# Patient Record
Sex: MALE | Race: ASIAN | NOT HISPANIC OR LATINO | Employment: FULL TIME | URBAN - METROPOLITAN AREA
[De-identification: names, ages, dates, MRNs, and addresses within clinical notes are randomized per-mention and may not be internally consistent; named-entity substitution may affect disease eponyms.]

---

## 2017-02-16 DIAGNOSIS — H40.2230 CHRONIC ANGLE-CLOSURE GLAUCOMA OF BOTH EYES, UNSPECIFIED GLAUCOMA STAGE: ICD-10-CM

## 2017-02-16 RX ORDER — LATANOPROST 50 UG/ML
1 SOLUTION/ DROPS OPHTHALMIC AT BEDTIME
Qty: 5 ML | Refills: 0 | Status: SHIPPED | OUTPATIENT
Start: 2017-02-16 | End: 2017-07-24

## 2017-02-16 NOTE — TELEPHONE ENCOUNTER
latanoprost (XALATAN) 0.005 % ophthalmic solution  Last Written Prescription Date:  8/24/16  Last Fill Quantity: 3 bottle,   # refills: 1  Last Office Visit with [facility], [facility] or [facility] prescribing provider: 2/11/16  Future Office visit:   None    Office Visit     2/11/2016  Eye Clinic    Multiple Providers   Ophthalmology    Chronic angle-closure glaucoma of both eyes, stage unspecified +1 more   Dx    Angle Closure Glaucoma Follow Up ; Referred by Referring , Oly, MD   Reason for visit    Progress Notes   Rosalio Aguilar MD (Physician)     Ophthalmology      Assessment & Plan   Assessment & Plan        Elver Ruelas is a 65 year old male with the following diagnoses:   1. Chronic angle-closure glaucoma of both eyes, stage unspecified    2. Blind right eye          End-stage narrow angle glaucoma (POAG) right eye. No light perception. No pain     High risk left eye with appositional closure > 270 degrees.   Now s/p laser peripheral iridotomy (LPI)   Patent today with improved focal anterior chamber depth  Minimal peripheral anterior synechiae      Good intraocular pressure response  Continue latanoprost at bedtime left eye for now  Recommend recheck in 3 mo with repeat visual field         Patient disposition:   Return in about 3 months (around 5/11/2016) for Repeat VF, Gonio and IOP check; .                    Routing refill request to provider for review/approval because:    latanoprost (XALATAN) 0.005 % ophthalmic solution. Needs f/u appt.

## 2017-02-23 ENCOUNTER — TELEPHONE (OUTPATIENT)
Dept: OPHTHALMOLOGY | Facility: CLINIC | Age: 67
End: 2017-02-23

## 2017-07-24 ENCOUNTER — TELEPHONE (OUTPATIENT)
Dept: OPHTHALMOLOGY | Facility: CLINIC | Age: 67
End: 2017-07-24

## 2017-12-14 ENCOUNTER — OFFICE VISIT (OUTPATIENT)
Dept: OPHTHALMOLOGY | Facility: CLINIC | Age: 67
End: 2017-12-14
Attending: OPHTHALMOLOGY
Payer: MEDICARE

## 2017-12-14 DIAGNOSIS — H40.2230 CHRONIC ANGLE-CLOSURE GLAUCOMA OF BOTH EYES: Primary | ICD-10-CM

## 2017-12-14 DIAGNOSIS — H54.40 BLIND RIGHT EYE: ICD-10-CM

## 2017-12-14 DIAGNOSIS — H40.2221 CHRONIC ANGLE-CLOSURE GLAUCOMA OF LEFT EYE, MILD STAGE: ICD-10-CM

## 2017-12-14 DIAGNOSIS — H25.13 AGE-RELATED NUCLEAR CATARACT OF BOTH EYES: ICD-10-CM

## 2017-12-14 DIAGNOSIS — H40.2213 CHRONIC ANGLE-CLOSURE GLAUCOMA OF RIGHT EYE, SEVERE STAGE: Primary | ICD-10-CM

## 2017-12-14 PROCEDURE — 99213 OFFICE O/P EST LOW 20 MIN: CPT | Mod: ZF

## 2017-12-14 PROCEDURE — 92083 EXTENDED VISUAL FIELD XM: CPT | Mod: ZF | Performed by: OPHTHALMOLOGY

## 2017-12-14 PROCEDURE — 92133 CPTRZD OPH DX IMG PST SGM ON: CPT | Mod: ZF | Performed by: OPHTHALMOLOGY

## 2017-12-14 RX ORDER — LATANOPROST 50 UG/ML
1 SOLUTION/ DROPS OPHTHALMIC AT BEDTIME
Qty: 12 BOTTLE | Refills: 0 | Status: SHIPPED | OUTPATIENT
Start: 2017-12-14 | End: 2017-12-14

## 2017-12-14 RX ORDER — LISINOPRIL AND HYDROCHLOROTHIAZIDE 20; 25 MG/1; MG/1
1 TABLET ORAL
COMMUNITY

## 2017-12-14 RX ORDER — LATANOPROST 50 UG/ML
1 SOLUTION/ DROPS OPHTHALMIC AT BEDTIME
Qty: 12 BOTTLE | Refills: 0 | Status: SHIPPED | OUTPATIENT
Start: 2017-12-14 | End: 2019-01-08

## 2017-12-14 ASSESSMENT — CONF VISUAL FIELD
OD_INFERIOR_TEMPORAL_RESTRICTION: 1
OD_INFERIOR_NASAL_RESTRICTION: 1
OD_SUPERIOR_NASAL_RESTRICTION: 1
OD_SUPERIOR_TEMPORAL_RESTRICTION: 1
OS_NORMAL: 1
METHOD: COUNTING FINGERS

## 2017-12-14 ASSESSMENT — VISUAL ACUITY
OS_SC: 20/25
OD_SC: NLP
METHOD: SNELLEN - LINEAR
CORRECTION_TYPE: GLASSES
OS_SC+: -3

## 2017-12-14 ASSESSMENT — TONOMETRY
OS_IOP_MMHG: 11
OD_IOP_MMHG: 41
IOP_METHOD: APPLANATION

## 2017-12-14 ASSESSMENT — EXTERNAL EXAM - RIGHT EYE: OD_EXAM: NORMAL

## 2017-12-14 ASSESSMENT — EXTERNAL EXAM - LEFT EYE: OS_EXAM: NORMAL

## 2017-12-14 ASSESSMENT — CUP TO DISC RATIO: OS_RATIO: 0.3

## 2017-12-14 ASSESSMENT — SLIT LAMP EXAM - LIDS
COMMENTS: NORMAL
COMMENTS: NORMAL

## 2017-12-14 NOTE — NURSING NOTE
Chief Complaints and History of Present Illnesses   Patient presents with     Follow Up For     Chronic angle-closure glaucoma of both eyes, stage unspecified      HPI    Affected eye(s):  Both   Symptoms:     Blurred vision   Decreased vision   Itching         Do you have eye pain now?:  No      Comments:  Pt thinks vision is getting worse in the LE. Sometimes OU itch.  Flor HERNANDEZ 1:24 PM December 14, 2017

## 2017-12-14 NOTE — MR AVS SNAPSHOT
After Visit Summary   12/14/2017    Elver Ruelas    MRN: [id number]           Patient Information     Date Of Birth          1950        Visit Information        Provider Department      12/14/2017 1:00 PM Ratna Burns Joshua Harlen, MD [facility]        Today's Diagnoses     Chronic angle-closure glaucoma    -  1    Chronic angle-closure glaucoma of both eyes, mild stage           Follow-ups after your visit        Follow-up notes from your care team     Return in about 6 months (around 6/14/2018) for Gonio with physician prior to dilation, 24-2 Dynamic VF.      Your next 10 appointments already scheduled     Jun 19, 2018  1:00 PM CDT   RETURN GENERAL with Rosalio Aguilar MD   Eye Clinic ([facility])    [facility]  [address] [facility]  [address] [address] [place] [id number] [phone]              Who to contact     Please call your clinic at [phone] to:    Ask questions about your health    Make or cancel appointments    Discuss your medicines    Learn about your test results    Speak to your doctor   If you have compliments or concerns about an experience at your clinic, or if you wish to file a complaint, please contact [facility] Patient Relations at [phone] or email us at [email or web address]         Additional Information About Your Visit        MyChart Information     Sossee is an electronic gateway that provides easy, online access to your medical records. With Sossee, you can request a clinic appointment, read your test results, renew a prescription or communicate with your care team.     To sign up for [employer] visit the website at [email or web address]   You will be asked to enter the access code listed below, as well as some personal information. Please follow the directions to create your username and password.     Your access code is: [email or web address]  Expires: 3/13/2018  6:30 AM     Your access  code will  in 90 days. If you need help or a new code, please contact your [facility] or call [phone] for assistance.        Care EveryWhere ID     This is your Care EveryWhere ID. This could be used by other organizations to access your [facility] medical records  [id number]         Blood Pressure from Last 3 Encounters:   16 106/63   16 138/80   12 129/73    Weight from Last 3 Encounters:   16 61.1 kg (134 lb 9.6 oz)   16 59 kg (130 lb)   12 56.7 kg (125 lb)              We Performed the Following     OCT Optic Nerve RNFL Spectralis OU (both eyes)     OVF 24-2 Dynamic OS          Today's Medication Changes          These changes are accurate as of: 17  2:29 PM.  If you have any questions, ask your nurse or doctor.               Start taking these medicines.        Dose/Directions    latanoprost 0.005 % ophthalmic solution   Commonly known as:  XALATAN   Used for:  Chronic angle-closure glaucoma of both eyes, mild stage   Started by:  Rosalio Aguilar MD        Dose:  1 drop   Place 1 drop Into the left eye At Bedtime   Quantity:  12 Bottle   Refills:  0            Where to get your medicines      Some of these will need a paper prescription and others can be bought over the counter.  Ask your nurse if you have questions.     Bring a paper prescription for each of these medications     latanoprost 0.005 % ophthalmic solution                Primary Care Provider Office Phone # Fax #    John Otto [phone]       [facility]  [facility] [address]        Equal Access to Services     MAYUR SERNA AH: Hadii aad ku hadasho Sojaime, waaxda luqadaha, qaybta kaalmada fuentes, renetta [place]. So [facility] [phone].    ATENCIÓN: Si habla español, tiene a inman disposición servicios gratuitos de asistencia lingüística. Llame al [phone].    We comply with applicable  federal civil rights laws and [place] laws. We do not discriminate on the basis of race, color, national origin, age, disability, sex, sexual orientation, or gender identity.            Thank you!     Thank you for choosing EYE CLINIC  for your care. Our goal is always to provide you with excellent care. Hearing back from our patients is one way we can continue to improve our services. Please take a few minutes to complete the written survey that you may receive in the mail after your visit with us. Thank you!             Your Updated Medication List - Protect others around you: Learn how to safely use, store and throw away your medicines at www.disposemymeds.org.          This list is accurate as of: 12/14/17  2:29 PM.  Always use your most recent med list.                   Brand Name Dispense Instructions for use Diagnosis    amLODIPine-atorvastatin 10-80 MG per tablet    CADUET     Take 1 tablet by mouth daily        ASPIRIN PO      Take 81 mg by mouth daily        latanoprost 0.005 % ophthalmic solution    XALATAN    12 Bottle    Place 1 drop Into the left eye At Bedtime    Chronic angle-closure glaucoma of both eyes, mild stage       lisinopril-hydrochlorothiazide 20-25 MG per tablet    PRINZIDE/ZESTORETIC     Take 1 tablet by mouth daily        UNKNOWN TO PATIENT      HBP med, 1 pink tablet daily.

## 2017-12-15 ASSESSMENT — CUP TO DISC RATIO: OD_RATIO: 1.0

## 2017-12-15 NOTE — PROGRESS NOTES
Assessment & Plan      Elver Ruelas is a 67 year old male with the following diagnoses:   1. Chronic angle-closure glaucoma of right eye, severe stage    2. Chronic angle-closure glaucoma of left eye, mild stage    3. Blind right eye    4. Age-related nuclear cataract of both eyes       Lost to follow up for 1.5 years  End-stage narrow angle glaucoma (POAG) right eye.  No light perception.  Denies pain  High risk left eye with appositional closure > 270 degrees.    S/P laser peripheral iridotomy (LPI)   Patent today, mild peripheral anterior synechiae     Excellent intraocular pressure response  Visual field and OCT stable     Long discussion of importance of regular follow-up  Monocular precautions reviewed  Continue latanoprost at bedtime left eye     Patient disposition:   Return in about 6 months (around 6/14/2018) for Gonio with physician prior to dilation, 24-2 Dynamic VF.        Attending Physician Attestation:  Complete documentation of historical and exam elements from today's encounter can be found in the full encounter summary report (not reduplicated in this progress note).  I personally obtained the chief complaint(s) and history of present illness.  I confirmed and edited as necessary the review of systems, past medical/surgical history, family history, social history, and examination findings as documented by others; and I examined the patient myself.  I personally reviewed the relevant tests, images, and reports as documented above.  I formulated and edited as necessary the assessment and plan and discussed the findings and management plan with the patient and family.Attending Physician Image/Tesing Attestation: I personally reviewed the ophthalmic test(s) associated with this encounter, agree with the interpretation(s) as documented by the resident/fellow, and have edited the corresponding report(s) as necessary.   . - Rosalio Aguilar MD

## 2019-01-07 DIAGNOSIS — H40.2213 CHRONIC ANGLE-CLOSURE GLAUCOMA OF RIGHT EYE, SEVERE STAGE: Primary | ICD-10-CM

## 2019-01-07 DIAGNOSIS — H40.2230 CHRONIC ANGLE-CLOSURE GLAUCOMA OF BOTH EYES: ICD-10-CM

## 2019-01-08 ENCOUNTER — OFFICE VISIT (OUTPATIENT)
Dept: OPHTHALMOLOGY | Facility: CLINIC | Age: 69
End: 2019-01-08
Attending: OPHTHALMOLOGY
Payer: MEDICARE

## 2019-01-08 DIAGNOSIS — H54.40 BLIND RIGHT EYE: ICD-10-CM

## 2019-01-08 DIAGNOSIS — H25.13 AGE-RELATED NUCLEAR CATARACT OF BOTH EYES: ICD-10-CM

## 2019-01-08 DIAGNOSIS — H40.2221 CHRONIC ANGLE-CLOSURE GLAUCOMA OF LEFT EYE, MILD STAGE: Primary | ICD-10-CM

## 2019-01-08 PROCEDURE — 92083 EXTENDED VISUAL FIELD XM: CPT | Mod: ZF | Performed by: OPHTHALMOLOGY

## 2019-01-08 PROCEDURE — G0463 HOSPITAL OUTPT CLINIC VISIT: HCPCS | Mod: ZF

## 2019-01-08 PROCEDURE — 92133 CPTRZD OPH DX IMG PST SGM ON: CPT | Mod: ZF | Performed by: OPHTHALMOLOGY

## 2019-01-08 PROCEDURE — 92020 GONIOSCOPY: CPT | Mod: ZF | Performed by: OPHTHALMOLOGY

## 2019-01-08 RX ORDER — LATANOPROST 50 UG/ML
1 SOLUTION/ DROPS OPHTHALMIC AT BEDTIME
Qty: 12 BOTTLE | Refills: 0 | Status: SHIPPED | OUTPATIENT
Start: 2019-01-08 | End: 2021-08-05

## 2019-01-08 ASSESSMENT — GONIOSCOPY
OS_NASAL: NO STRUCTURES
OS_TEMPORAL: NO STRUCTURES
OS_SUPERIOR: NO STRUCTURES

## 2019-01-08 ASSESSMENT — TONOMETRY
OD_IOP_MMHG: 49
OS_IOP_MMHG: 15
IOP_METHOD: APPLANATION

## 2019-01-08 ASSESSMENT — PACHYMETRY
OS_CT(UM): 511
OD_CT(UM): 523

## 2019-01-08 ASSESSMENT — VISUAL ACUITY
OS_SC+: -1
OD_SC: NLP
METHOD_MR: DEFERRED.
METHOD: SNELLEN - LINEAR
OS_SC: 20/25

## 2019-01-08 ASSESSMENT — EXTERNAL EXAM - LEFT EYE: OS_EXAM: NORMAL

## 2019-01-08 ASSESSMENT — CUP TO DISC RATIO
OD_RATIO: 1.0
OS_RATIO: 0.3

## 2019-01-08 ASSESSMENT — CONF VISUAL FIELD
OD_SUPERIOR_NASAL_RESTRICTION: 1
OD_INFERIOR_NASAL_RESTRICTION: 1
OD_SUPERIOR_TEMPORAL_RESTRICTION: 1
OD_INFERIOR_TEMPORAL_RESTRICTION: 1

## 2019-01-08 ASSESSMENT — SLIT LAMP EXAM - LIDS
COMMENTS: NORMAL
COMMENTS: NORMAL

## 2019-01-08 ASSESSMENT — EXTERNAL EXAM - RIGHT EYE: OD_EXAM: NORMAL

## 2019-01-08 NOTE — NURSING NOTE
Patient presents for annual glaucoma exam with visual field testing. H/o Chronic angle-closure glaucoma of right eye, severe stage, blind. Since the last visit the vision in the LE remains the same. The eyes feel itchy bilateral. No pain or discomfort, no redness or tears. No flashes or floaters. Consistent with Latanoprost at night, does not miss. Does not wear corrective lenses. Patient is not interested in wearing glasses, will not wear. Mariaa Mckeon COT 1:54 PM January 8, 2019

## 2019-01-09 NOTE — PROGRESS NOTES
Chief Complaint(s) and History of Present Illness(es)     Glaucoma Follow-Up     Laterality: both eyes    Associated symptoms: Negative for redness, eye pain, floaters, flashes   and tearing    Treatment side effects: none    Compliance with Treatment: always    Pain scale: 0/10              Comments     Patient presents for annual glaucoma exam with visual field testing. H/o   Chronic angle-closure glaucoma of right eye, severe stage, blind. Since   the last visit the vision in the LE remains the same. The eyes feel itchy   bilateral. No pain or discomfort, no redness or tears. No flashes or   floaters. Consistent with Latanoprost at night, does not miss. Does not   wear corrective lenses. Patient is not interested in wearing glasses, will   not wear. Mariaa Mckeon COT 1:54 PM January 8, 2019              Review of systems for the eyes was negative other than the pertinent positives/negatives listed in the HPI.      Assessment & Plan      Elver Ruelas is a 68 year old male with the following diagnoses:   1. Chronic angle-closure glaucoma of left eye, mild stage    2. Blind right eye    3. Age-related nuclear cataract of both eyes         Missed 6 mo appt (here 1 year from previous exam)  Cataract with cortical progression.  Gonio with early peripheral anterior synechiae inferiorly  Intraocular pressure acceptable, patent laser peripheral iridotomy (LPI) left eye   Visual field and OCT stable     End-stage narrow angle glaucoma (POAG) right eye.  No light perception.  Denies pain    Long discussion of importance of regular follow-up; recommend repeat gonio and intraocular pressure check in 6 mo  Monocular precautions reviewed  Continue latanoprost at bedtime left eye         Patient disposition:   Return in about 6 months (around 7/8/2019) for VT only, Gonio, 24-2 dynamic.          Attending Physician Attestation:  Complete documentation of historical and exam elements from today's encounter can be found in the full  encounter summary report (not reduplicated in this progress note).  I personally obtained the chief complaint(s) and history of present illness.  I confirmed and edited as necessary the review of systems, past medical/surgical history, family history, social history, and examination findings as documented by others; and I examined the patient myself.  I personally reviewed the relevant tests, images, and reports as documented above.  I formulated and edited as necessary the assessment and plan and discussed the findings and management plan with the patient and family. . - Rosalio Aguilar MD

## 2021-08-05 ENCOUNTER — OFFICE VISIT (OUTPATIENT)
Dept: OPHTHALMOLOGY | Facility: CLINIC | Age: 71
End: 2021-08-05
Attending: OPHTHALMOLOGY
Payer: MEDICARE

## 2021-08-05 DIAGNOSIS — H25.813 MIXED TYPE AGE-RELATED CATARACT, BOTH EYES: ICD-10-CM

## 2021-08-05 DIAGNOSIS — H40.2221 CHRONIC ANGLE-CLOSURE GLAUCOMA OF LEFT EYE, MILD STAGE: Primary | ICD-10-CM

## 2021-08-05 DIAGNOSIS — H35.031 BLIND HYPERTENSIVE EYE, RIGHT: ICD-10-CM

## 2021-08-05 PROCEDURE — 92083 EXTENDED VISUAL FIELD XM: CPT | Performed by: OPHTHALMOLOGY

## 2021-08-05 PROCEDURE — G0463 HOSPITAL OUTPT CLINIC VISIT: HCPCS

## 2021-08-05 PROCEDURE — 66761 REVISION OF IRIS: CPT | Mod: LT | Performed by: OPHTHALMOLOGY

## 2021-08-05 PROCEDURE — 99214 OFFICE O/P EST MOD 30 MIN: CPT | Mod: 25 | Performed by: OPHTHALMOLOGY

## 2021-08-05 RX ORDER — LATANOPROST 50 UG/ML
1 SOLUTION/ DROPS OPHTHALMIC AT BEDTIME
Qty: 7.5 ML | Refills: 3 | Status: SHIPPED | OUTPATIENT
Start: 2021-08-05

## 2021-08-05 ASSESSMENT — PACHYMETRY
OD_CT(UM): 523
OS_CT(UM): 511

## 2021-08-05 ASSESSMENT — CONF VISUAL FIELD
OD_INFERIOR_NASAL_RESTRICTION: 1
METHOD: COUNTING FINGERS
OD_SUPERIOR_NASAL_RESTRICTION: 1
OD_INFERIOR_TEMPORAL_RESTRICTION: 1
OD_SUPERIOR_TEMPORAL_RESTRICTION: 1

## 2021-08-05 ASSESSMENT — GONIOSCOPY
OS_TEMPORAL: NO STRUCTURE
OD_SUPERIOR: CLOSED
OD_TEMPORAL: CLOSED
OD_NASAL: CLOSED
OS_SUPERIOR: NO STRUCTURE
OD_INFERIOR: CLOSED
OS_NASAL: BARELY VISIBLE

## 2021-08-05 ASSESSMENT — SLIT LAMP EXAM - LIDS
COMMENTS: NORMAL
COMMENTS: NORMAL

## 2021-08-05 ASSESSMENT — EXTERNAL EXAM - RIGHT EYE: OD_EXAM: NORMAL

## 2021-08-05 ASSESSMENT — EXTERNAL EXAM - LEFT EYE: OS_EXAM: NORMAL

## 2021-08-05 ASSESSMENT — VISUAL ACUITY
OS_SC: 20/25
OS_SC+: -1
METHOD: SNELLEN - LINEAR
OD_SC: NLP

## 2021-08-05 ASSESSMENT — TONOMETRY
OD_IOP_MMHG: 32
IOP_METHOD: TONOPEN
OS_IOP_MMHG: 18

## 2021-08-05 NOTE — NURSING NOTE
Chief Complaints and History of Present Illnesses   Patient presents with     Follow Up     Chronic angle-closure glaucoma of left eye, mild stage Blind right eye          Chief Complaint(s) and History of Present Illness(es)     Follow Up     Laterality: both eyes    Course: stable    Associated symptoms: Negative for eye pain, headache, floaters and flashes    Treatments tried: eye drops    Pain scale: 0/10    Comments: Chronic angle-closure glaucoma of left eye, mild stage Blind right eye                   Comments     Pt states no change in [place] since last visit  Pt would like a RX printed for 1 year      Noelle Aguilar COT 12:34 PM August 5, 2021

## 2021-08-27 ENCOUNTER — TELEPHONE (OUTPATIENT)
Dept: OPHTHALMOLOGY | Facility: CLINIC | Age: 71
End: 2021-08-27

## 2021-08-27 NOTE — TELEPHONE ENCOUNTER
Left message for patient (with  #[phone]) that Dr. Aguilar missed him at his appointment yesterday and would like to see him back in the next 1-2 weeks. Please call [phone] to reschedule.     RITO BABCOCK 9:28 AM August 27, 2021

## 2022-06-17 ENCOUNTER — LAB REQUISITION (OUTPATIENT)
Dept: LAB | Facility: CLINIC | Age: 72
End: 2022-06-17
Payer: MEDICARE

## 2022-06-17 DIAGNOSIS — Z00.00 ENCOUNTER FOR GENERAL ADULT MEDICAL EXAMINATION WITHOUT ABNORMAL FINDINGS: ICD-10-CM

## 2022-06-17 DIAGNOSIS — I10 ESSENTIAL (PRIMARY) HYPERTENSION: ICD-10-CM

## 2022-06-17 LAB
ALBUMIN SERPL-MCNC: 4.2 G/DL (ref 3.4–5)
ALP SERPL-CCNC: 51 U/L (ref 40–150)
ALT SERPL W P-5'-P-CCNC: 34 U/L (ref 0–70)
ANION GAP SERPL CALCULATED.3IONS-SCNC: 8 MMOL/L (ref 3–14)
AST SERPL W P-5'-P-CCNC: 30 U/L (ref 0–45)
BILIRUB SERPL-MCNC: 0.5 MG/DL (ref 0.2–1.3)
BUN SERPL-MCNC: 21 MG/DL (ref 7–30)
CALCIUM SERPL-MCNC: 8.9 MG/DL (ref 8.5–10.1)
CHLORIDE BLD-SCNC: 104 MMOL/L (ref 94–109)
CHOLEST SERPL-MCNC: 184 MG/DL
CO2 SERPL-SCNC: 28 MMOL/L (ref 20–32)
CREAT SERPL-MCNC: 0.65 MG/DL (ref 0.66–1.25)
FASTING STATUS PATIENT QL REPORTED: YES
GFR SERPL CREATININE-BSD FRML MDRD: >90 ML/MIN/1.73M2
GLUCOSE BLD-MCNC: 107 MG/DL (ref 70–99)
HDLC SERPL-MCNC: 58 MG/DL
LDLC SERPL CALC-MCNC: 108 MG/DL
NONHDLC SERPL-MCNC: 126 MG/DL
POTASSIUM BLD-SCNC: 4.3 MMOL/L (ref 3.4–5.3)
PROT SERPL-MCNC: 7.3 G/DL (ref 6.8–8.8)
SODIUM SERPL-SCNC: 140 MMOL/L (ref 133–144)
TRIGL SERPL-MCNC: 92 MG/DL

## 2022-06-17 PROCEDURE — 80061 LIPID PANEL: CPT | Mod: ORL | Performed by: FAMILY MEDICINE

## 2022-06-17 PROCEDURE — 80053 COMPREHEN METABOLIC PANEL: CPT | Mod: ORL | Performed by: FAMILY MEDICINE

## 2022-06-30 ENCOUNTER — OFFICE VISIT (OUTPATIENT)
Dept: OPHTHALMOLOGY | Facility: CLINIC | Age: 72
End: 2022-06-30
Attending: OPHTHALMOLOGY
Payer: MEDICARE

## 2022-06-30 DIAGNOSIS — H25.813 MIXED TYPE AGE-RELATED CATARACT, BOTH EYES: Primary | ICD-10-CM

## 2022-06-30 DIAGNOSIS — H40.2221 CHRONIC ANGLE-CLOSURE GLAUCOMA OF LEFT EYE, MILD STAGE: ICD-10-CM

## 2022-06-30 DIAGNOSIS — H35.031 BLIND HYPERTENSIVE EYE, RIGHT: ICD-10-CM

## 2022-06-30 PROCEDURE — G0463 HOSPITAL OUTPT CLINIC VISIT: HCPCS | Mod: 25

## 2022-06-30 PROCEDURE — 76519 ECHO EXAM OF EYE: CPT | Performed by: OPHTHALMOLOGY

## 2022-06-30 PROCEDURE — 99214 OFFICE O/P EST MOD 30 MIN: CPT | Mod: GC | Performed by: OPHTHALMOLOGY

## 2022-06-30 PROCEDURE — 92025 CPTRIZED CORNEAL TOPOGRAPHY: CPT | Performed by: OPHTHALMOLOGY

## 2022-06-30 PROCEDURE — G0463 HOSPITAL OUTPT CLINIC VISIT: HCPCS

## 2022-06-30 ASSESSMENT — EXTERNAL EXAM - LEFT EYE: OS_EXAM: NORMAL

## 2022-06-30 ASSESSMENT — TONOMETRY
OD_IOP_MMHG: 34
OS_IOP_MMHG: 14
IOP_METHOD: TONOPEN

## 2022-06-30 ASSESSMENT — SLIT LAMP EXAM - LIDS
COMMENTS: NORMAL
COMMENTS: NORMAL

## 2022-06-30 ASSESSMENT — VISUAL ACUITY
OS_SC: 20/25
METHOD: SNELLEN - LINEAR
OD_SC: NLP
OS_SC+: -2

## 2022-06-30 ASSESSMENT — CONF VISUAL FIELD
OD_SUPERIOR_TEMPORAL_RESTRICTION: 1
OD_SUPERIOR_NASAL_RESTRICTION: 1
OD_INFERIOR_NASAL_RESTRICTION: 1
OS_NORMAL: 1
OD_INFERIOR_TEMPORAL_RESTRICTION: 1

## 2022-06-30 ASSESSMENT — CUP TO DISC RATIO
OS_RATIO: 0.3
OD_RATIO: 1.0

## 2022-06-30 ASSESSMENT — EXTERNAL EXAM - RIGHT EYE: OD_EXAM: NORMAL

## 2022-06-30 NOTE — PROGRESS NOTES
Chief Complaint(s) and History of Present Illness(es)     Angle Closure Glaucoma Follow Up         Comments     Patient states that his left eye vision is the same.   No flashes of light. No pain and irritation. No floaters.    Ocular Meds: Latanoprost daily in the ELIAS Bo COT, June 30, 2022 1:10 PM     Review of systems for the eyes was negative other than the pertinent positives/negatives listed in the HPI.      Assessment & Plan      Elver Ruelas is a 70 year old male with the following diagnoses:   1. Chronic angle-closure glaucoma of left eye, mild stage    2. Mixed type age-related cataract, both eyes    3. Blind hypertensive eye, right       History obtained via interpretor   Lost to follow  2019 - 2021  Patient is on Latanoprost at bedtime left eye, reports good compliance  Right eye is comfortable    Vision overall stable per patient    End-stage narrow angle glaucoma (POAG) right eye.  No light perception.  Denies pain  High risk left eye with appositional closure > 270 degrees.    S/p repeat LPI left eye; now patent temporally  I believe his risk of angle closure is higher than with monocular cataract surgery left eye     Special equipment/needs:  Anesthesia: General  Dilation: Unknown  Iris expansion: Unknown  Pseudoexfoliation: No pseudoexfoliation  Trypan Blue: Yes   CEIOL left eye, faculty, emmetropia  Dex/NSAID  +/- GSL     Return precautions reviewed   Continue latanoprost at bedtime left eye for now      Rahul Polanco, PGY4  Ophthalmology Resident      Attending Physician Attestation:  Complete documentation of historical and exam elements from today's encounter can be found in the full encounter summary report (not reduplicated in this progress note).  I personally obtained the chief complaint(s) and history of present illness.  I confirmed and edited as necessary the review of systems, past medical/surgical history, family history, social history, and examination findings as  documented by others; and I examined the patient myself.  I personally reviewed the relevant tests, images, and reports as documented above.  I formulated and edited as necessary the assessment and plan and discussed the findings and management plan with the patient and family Attending Physician Image/Tesing Attestation: I personally reviewed the ophthalmic test(s) associated with this encounter, agree with the interpretation(s) as documented by the resident/fellow, and have edited the corresponding report(s) as necessary.  Today with Elver Ruelas  and his wife, I reviewed the indications, risks, benefits, and alternatives of the proposed surgical procedure including, but not limited to, failure obtain the desired result  and need for additional surgery, bleeding, infection, loss of vision, loss of the eye, and the remote possibility of permanent damage to any organ system or death with the use of anesthesia.  I provided multiple opportunities for the questions, answered all questions to the best of my ability, and confirmed that my answers and my discussion were understood.      .

## 2022-06-30 NOTE — NURSING NOTE
Chief Complaints and History of Present Illnesses   Patient presents with     Angle Closure Glaucoma Follow Up     Chief Complaint(s) and History of Present Illness(es)     Angle Closure Glaucoma Follow Up               Comments     Patient states that his left eye vision is the same.   No flashes of light. No pain and irritation. No floaters.    Ocular Meds: Latanoprost daily in the     Navi Schultezate COT, June 30, 2022 1:10 PM

## 2022-07-01 ENCOUNTER — TELEPHONE (OUTPATIENT)
Dept: OPHTHALMOLOGY | Facility: CLINIC | Age: 72
End: 2022-07-01

## 2022-07-01 PROBLEM — H40.2221 CHRONIC ANGLE-CLOSURE GLAUCOMA OF LEFT EYE, MILD STAGE: Status: ACTIVE | Noted: 2022-07-01

## 2022-07-01 PROBLEM — H25.813 MIXED TYPE AGE-RELATED CATARACT, BOTH EYES: Status: ACTIVE | Noted: 2022-07-01

## 2022-07-01 NOTE — TELEPHONE ENCOUNTER
Called patients daughter Thy to schedule procedure with Dr. Aguilar, there was no answer.  Left message with my direct line [phone].

## 2022-07-07 NOTE — TELEPHONE ENCOUNTER
FUTURE VISIT INFORMATION      SURGERY INFORMATION:    Date: 8/15/22    Location: or    Surgeon:  Rosalio Aguilar MD    Anesthesia Type:  general    Procedure: LEFT EYE PHACOEMULSIFICATION, COMPLEX CATARACT, WITH STANDARD INTRAOCULAR LENS IMPLANT INSERTION    Consult: ov 6/30    RECORDS REQUESTED FROM:       Primary Care Provider: AUDREYANNIA    Pertinent Medical History: hypertension

## 2022-07-09 ENCOUNTER — HEALTH MAINTENANCE LETTER (OUTPATIENT)
Age: 72
End: 2022-07-09

## 2022-08-11 ENCOUNTER — PRE VISIT (OUTPATIENT)
Dept: SURGERY | Facility: CLINIC | Age: 72
End: 2022-08-11

## 2022-08-11 ENCOUNTER — ANESTHESIA EVENT (OUTPATIENT)
Dept: SURGERY | Facility: AMBULATORY SURGERY CENTER | Age: 72
End: 2022-08-11
Payer: MEDICARE

## 2022-08-11 ENCOUNTER — LAB (OUTPATIENT)
Dept: LAB | Facility: CLINIC | Age: 72
End: 2022-08-11

## 2022-08-11 ENCOUNTER — OFFICE VISIT (OUTPATIENT)
Dept: SURGERY | Facility: CLINIC | Age: 72
End: 2022-08-11
Payer: MEDICARE

## 2022-08-11 VITALS
SYSTOLIC BLOOD PRESSURE: 143 MMHG | OXYGEN SATURATION: 97 % | DIASTOLIC BLOOD PRESSURE: 89 MMHG | RESPIRATION RATE: 16 BRPM | TEMPERATURE: 98.5 F | BODY MASS INDEX: 24.48 KG/M2 | WEIGHT: 133 LBS | HEIGHT: 62 IN | HEART RATE: 75 BPM

## 2022-08-11 DIAGNOSIS — Z20.822 ENCOUNTER FOR LABORATORY TESTING FOR COVID-19 VIRUS: ICD-10-CM

## 2022-08-11 DIAGNOSIS — Z01.818 PRE-OP EVALUATION: Primary | ICD-10-CM

## 2022-08-11 LAB — SARS-COV-2 RNA RESP QL NAA+PROBE: NEGATIVE

## 2022-08-11 PROCEDURE — U0003 INFECTIOUS AGENT DETECTION BY NUCLEIC ACID (DNA OR RNA); SEVERE ACUTE RESPIRATORY SYNDROME CORONAVIRUS 2 (SARS-COV-2) (CORONAVIRUS DISEASE [COVID-19]), AMPLIFIED PROBE TECHNIQUE, MAKING USE OF HIGH THROUGHPUT TECHNOLOGIES AS DESCRIBED BY CMS-2020-01-R: HCPCS | Performed by: FAMILY MEDICINE

## 2022-08-11 PROCEDURE — 99203 OFFICE O/P NEW LOW 30 MIN: CPT | Performed by: PHYSICIAN ASSISTANT

## 2022-08-11 ASSESSMENT — PAIN SCALES - GENERAL: PAINLEVEL: NO PAIN (0)

## 2022-08-11 ASSESSMENT — LIFESTYLE VARIABLES: TOBACCO_USE: 1

## 2022-08-11 NOTE — H&P
Pre-Operative H & P     CC:  Preoperative exam to assess for increased cardiopulmonary risk while undergoing surgery and anesthesia.    Date of Encounter: 8/11/2022  Primary Care Physician:  John Otto     Reason for visit:   Encounter Diagnosis   Name Primary?     Pre-op evaluation Yes       HPI  Elver Ruelas is a 71 year old male who presents for pre-operative H & P in preparation for  Procedure Information     Case: [id number] Date/Time: 08/15/22 0840    Procedure: LEFT EYE PHACOEMULSIFICATION, COMPLEX CATARACT, WITH STANDARD INTRAOCULAR LENS IMPLANT INSERTION (Left Eye)    Anesthesia type: General    Diagnosis:       Chronic angle-closure glaucoma of left eye, mild stage [H40.2221]      Mixed type age-related cataract, both eyes [H25.813]    Pre-op diagnosis:       Chronic angle-closure glaucoma of left eye, mild stage [H40.2221]      Mixed type age-related cataract, both eyes [H25.813]    Location: [place] [address] / [facility]    Providers: Rosalio Aguilar MD          Patient is being evaluated for comorbid conditions of hypertension, dyslipidemia, former tobacco use    Mr. Ruelas has a history of chronic glaucoma and cataracts. He follows with Dr. Aguilar. He is now scheduled for the above procedure.     History is obtained from the patient and chart review. Patient's wife was present.  assisted with this video    Hx of abnormal bleeding or anti-platelet use: ASA 81      Past Medical History  Past Medical History:   Diagnosis Date     Ex-smoker      Hyperlipidemia LDL goal < 160        Past Surgical History  No past surgical history on file.    Prior to Admission Medications  Current Outpatient Medications   Medication Sig Dispense Refill     amLODIPine-atorvastatin (CADUET) 10-80 MG per tablet Take 1 tablet by mouth daily (with lunch)       ASPIRIN PO Take 81 mg by mouth daily (with lunch)       lisinopril-hydrochlorothiazide  (PRINZIDE/ZESTORETIC) 20-25 MG per tablet Take 1 tablet by mouth daily (with lunch)       latanoprost (XALATAN) 0.005 % ophthalmic solution Place 1 drop Into the left eye At Bedtime 7.5 mL 3       Allergies  No Known Allergies    Social History  Social History     Socioeconomic History     Marital status:      Spouse name: Not on file     Number of children: Not on file     Years of education: Not on file     Highest education level: Not on file   Occupational History     Not on file   Tobacco Use     Smoking status: Never Smoker     Smokeless tobacco: Never Used   Substance and Sexual Activity     Alcohol use: Yes     Comment: 2/yr     Drug use: No     Sexual activity: Yes     Partners: Female   Other Topics Concern     Parent/sibling w/ CABG, MI or angioplasty before 65F 55M? No   Social History Narrative    ** Merged History Encounter **          Social Determinants of Health     Financial Resource Strain: Not on file   Food Insecurity: Not on file   Transportation Needs: Not on file   Physical Activity: Not on file   Stress: Not on file   Social Connections: Not on file   Intimate Partner Violence: Not on file   Housing Stability: Not on file       Family History  Family History   Problem Relation Age of Onset     Arthritis Mother      Alcohol/Drug Father      Gastrointestinal Disease Father      Glaucoma No family hx of      Macular Degeneration No family hx of      Diabetes No family hx of      Anesthesia Reaction No family hx of      Deep Vein Thrombosis (DVT) No family hx of        Review of Systems  The complete review of systems is negative other than noted in the HPI or here.   Anesthesia Evaluation   Pt has not had prior anesthetic         ROS/MED HX  ENT/Pulmonary:     (+) FRANCIE risk factors, snores loudly, hypertension, tobacco use, Past use,     Neurologic:  - neg neurologic ROS     Cardiovascular:     (+) Dyslipidemia hypertension-----Taking blood thinners     METS/Exercise Tolerance: >4 METS  "Comment: Going for walks, leg and arm exercises. 2 hours daily   Hematologic:  - neg hematologic  ROS  (-) history of blood clots and history of blood transfusion   Musculoskeletal:  - neg musculoskeletal ROS     GI/Hepatic:     (+) liver disease (heatic steatosis ),     Renal/Genitourinary:  - neg Renal ROS     Endo:  - neg endo ROS  (-) chronic steroid usage   Psychiatric/Substance Use:  - neg psychiatric ROS     Infectious Disease:  - neg infectious disease ROS     Malignancy:  - neg malignancy ROS     Other:            BP (!) 143/89 (BP Location: Left arm, Patient Position: Sitting, Cuff Size: Adult Regular)   Pulse 75   Temp 98.5  F (36.9  C) (Oral)   Resp 16   Ht 1.57 m (5' 1.81\")   Wt 60.3 kg (133 lb)   SpO2 97%   BMI 24.48 kg/m      Physical Exam   Constitutional: Awake, alert, cooperative, no apparent distress, and appears stated age.  Eyes: Pupils equal, round and reactive to light, extra ocular muscles intact, sclera clear, conjunctiva normal.  HENT: Normocephalic, oral pharynx with moist mucus membranes, upper and lower dentures  Respiratory: Clear to auscultation bilaterally, no crackles or wheezing.  Cardiovascular: Regular rate and rhythm, normal S1 and S2, and no murmur noted.  Carotids no bruits. No edema. Palpable pulses to radial   arteries.   GI: Normal bowel sounds, soft, non-distended, non-tender  Genitourinary:  deferred  Skin: Warm and dry.     Musculoskeletal: Full ROM of neck. There is no redness, warmth, or swelling of the exposed joints. Gross motor strength is normal.    Neurologic: Awake, alert, oriented to name, place and time. Cranial nerves II-XII are grossly intact. Gait is normal.   Neuropsychiatric: Calm, cooperative. Normal affect.     Prior Labs/Diagnostic Studies   All labs and imaging personally reviewed     EKG/ stress test - if available please see in ROS above   No results found.  No flowsheet data found.      The patient's records and results personally reviewed by " "this provider.     Outside records reviewed from: Care Everywhere    Assessment      Elver Ruelas is a 71 year old male seen as a PAC referral for risk assessment and optimization for anesthesia.    Plan/Recommendations  Pt will be optimized for the proposed procedure.  See below for details on the assessment, risk, and preoperative recommendations    NEUROLOGY  - No history of TIA, CVA or seizure  -Post Op delirium risk factors:  No risk identified    ENT  - No current airway concerns.  Will need to be reassessed day of surgery.  Mallampati: I  TM: > 3    CARDIAC  - Hypertension  Well controlled  - Hyperlipidemia  -denies sanya cardiac history or symptoms  - METS (Metabolic Equivalents)  Patient performs 4 or more METS exercise without symptoms            Total Score: 0      RCRI-Very low risk: Class 1 0.4% complication rate            Total Score: 0        PULMONARY  FRANCIE Medium Risk            Total Score: 4    FRANCIE: Snores loudly    FRANCIE: Hypertension    FRANCIE: Over 50 ys old    FRANCIE: Male      - Denies asthma or inhaler use  - Tobacco History      History   Smoking Status     Never Smoker   Smokeless Tobacco     Never Used       GI  PONV Low Risk  Total Score: 1           1 AN PONV: Patient is not a current smoker        /RENAL  - Baseline Creatinine  0.6    ENDOCRINE    - BMI: Estimated body mass index is 24.48 kg/m  as calculated from the following:    Height as of this encounter: 1.57 m (5' 1.81\").    Weight as of this encounter: 60.3 kg (133 lb).  Healthy Weight (BMI 18.5-24.9)  - No history of Diabetes Mellitus    HEME  VTE Low Risk 0.5%            Total Score: 3    VTE: Greater than 59 yrs old    VTE: Male      - Platelet disfunction second to Aspirin (Estephania, many others)    The patient is optimized for their procedure. AVS with information on surgery time/arrival time, meds and NPO status given by nursing staff. No further diagnostic testing indicated.      On the day of service:     Prep time: 12 " minutes  Visit time: 24 minutes  Documentation time: 5 minutes  ------------------------------------------  Total time: 41 minutes      Mackenzie Ureña PA-C  Preoperative Assessment Center  [facility]  Clinic and Surgery Center  Phone: [phone]  Fax: [phone]

## 2022-08-11 NOTE — PATIENT INSTRUCTIONS
Preparing for Your Surgery      Name:  Elver Ruelas   MRN:  [id number]   :  1950   Today's Date:  2022         Arriving for surgery:  Surgery date:  8/15/22  Arrival time:  7:10am    Restrictions due to COVID 19:    Effective 2022 [facility] has the following visitor restriction guidelines   1 visitor may accompany each patient  That visitor may wait for patient in the Surgery Center Waiting room  All visitors must wear a mask and socially distance       [employer] parking is available for anyone with mobility limitations or disabilities. (Monday- Friday 7 am- 5 pm)    Please come to:    [facility]  [address] [place] [id number]    Check in on the 5th floor, Ambulatory Surgery Center.    What can I eat or drink?    -  You may eat and drink normally until 8 hours before surgery. (Until 12 midnight)  -  You may have a little water with your medications in the morning. (Until 4:00 am)    Examples of clear liquids:  Water  Clear broth  Juices (apple, white grape, white cranberry  and cider) without pulp  Noncarbonated, powder based beverages  (lemonade and Edi-Aid)  Sodas (Sprite, 7-Up, ginger ale and seltzer)  Coffee or tea (without milk or cream)  Gatorade    --No alcohol for at least 24 hours before surgery    Which medicines can I take?    Hold Multivitamins for 7 days before surgery.  Hold Supplements for 7 days before surgery.  Hold Ibuprofen (Advil, Motrin) for 1 day before surgery--unless otherwise directed by surgeon.  Hold Naproxen (Aleve) for 4 days before surgery.    -  DO NOT take the following medications the day of surgery:  Lisinopril-hydrochlorothiazide      -  PLEASE TAKE the following medications the day of surgery   Aspirin may be taken at the usual time  Amlodipine-atorvastatin  Latanoprost (Xalatan)    How do I prepare myself?  - Please take 2 showers before surgery using Scrubcare or Hibiclens soap.    Use this soap only from the neck  to your toes.     Leave the soap on your skin for one minute--then rinse thoroughly.      You may use your own shampoo and conditioner; no other hair products.   - Please remove all jewelry and body piercings.  - No lotions, deodorants or fragrance.  - No makeup or fingernail polish.   - Bring your ID and insurance card.    -If you have a Deep Brain Stimulator, a Spinal Cord Stimulator or any implanted Neuro device you must bring the remote to the Surgery Center          ALL PATIENTS ARE REQUIRED TO HAVE A RESPONSIBLE ADULT TO DRIVE AND BE IN ATTENDANCE WITH THEM FOR 24 HOURS FOLLOWING SURGERY       Questions or Concerns:     -For questions regarding the day of surgery please contact the Ambulatory Surgery Center at [phone].    -If you have health changes between today and your surgery please contact your surgeon.     For questions after surgery please call your surgeons office.

## 2022-08-15 ENCOUNTER — OFFICE VISIT (OUTPATIENT)
Dept: OPHTHALMOLOGY | Facility: CLINIC | Age: 72
End: 2022-08-15

## 2022-08-15 ENCOUNTER — HOSPITAL ENCOUNTER (OUTPATIENT)
Facility: AMBULATORY SURGERY CENTER | Age: 72
Discharge: HOME | End: 2022-08-15
Attending: OPHTHALMOLOGY
Payer: MEDICARE

## 2022-08-15 ENCOUNTER — ANESTHESIA (OUTPATIENT)
Dept: SURGERY | Facility: AMBULATORY SURGERY CENTER | Age: 72
End: 2022-08-15
Payer: MEDICARE

## 2022-08-15 VITALS
WEIGHT: 133 LBS | OXYGEN SATURATION: 96 % | SYSTOLIC BLOOD PRESSURE: 117 MMHG | HEART RATE: 77 BPM | HEIGHT: 62 IN | DIASTOLIC BLOOD PRESSURE: 76 MMHG | RESPIRATION RATE: 20 BRPM | TEMPERATURE: 96.9 F | BODY MASS INDEX: 24.48 KG/M2

## 2022-08-15 DIAGNOSIS — Z96.1 PSEUDOPHAKIA: ICD-10-CM

## 2022-08-15 DIAGNOSIS — H25.813 MIXED TYPE AGE-RELATED CATARACT, BOTH EYES: ICD-10-CM

## 2022-08-15 DIAGNOSIS — Z98.890 POSTOPERATIVE EYE STATE: Primary | ICD-10-CM

## 2022-08-15 DIAGNOSIS — H40.2221 CHRONIC ANGLE-CLOSURE GLAUCOMA OF LEFT EYE, MILD STAGE: ICD-10-CM

## 2022-08-15 PROCEDURE — 66984 XCAPSL CTRC RMVL W/O ECP: CPT | Mod: LT

## 2022-08-15 PROCEDURE — 66984 XCAPSL CTRC RMVL W/O ECP: CPT | Mod: LT | Performed by: OPHTHALMOLOGY

## 2022-08-15 PROCEDURE — 99024 POSTOP FOLLOW-UP VISIT: CPT | Mod: GC | Performed by: OPHTHALMOLOGY

## 2022-08-15 RX ORDER — LIDOCAINE 40 MG/G
CREAM TOPICAL
Status: DISCONTINUED | OUTPATIENT
Start: 2022-08-15 | End: 2022-08-15 | Stop reason: HOSPADM

## 2022-08-15 RX ORDER — LIDOCAINE HYDROCHLORIDE 10 MG/ML
INJECTION, SOLUTION EPIDURAL; INFILTRATION; INTRACAUDAL; PERINEURAL PRN
Status: DISCONTINUED | OUTPATIENT
Start: 2022-08-15 | End: 2022-08-15 | Stop reason: HOSPADM

## 2022-08-15 RX ORDER — OXYCODONE HYDROCHLORIDE 5 MG/1
5 TABLET ORAL EVERY 4 HOURS PRN
Status: DISCONTINUED | OUTPATIENT
Start: 2022-08-15 | End: 2022-08-17 | Stop reason: HOSPADM

## 2022-08-15 RX ORDER — ACETAMINOPHEN 325 MG/1
975 TABLET ORAL ONCE
Status: COMPLETED | OUTPATIENT
Start: 2022-08-15 | End: 2022-08-15

## 2022-08-15 RX ORDER — LIDOCAINE HYDROCHLORIDE 20 MG/ML
INJECTION, SOLUTION INFILTRATION; PERINEURAL PRN
Status: DISCONTINUED | OUTPATIENT
Start: 2022-08-15 | End: 2022-08-15

## 2022-08-15 RX ORDER — HALOPERIDOL 5 MG/ML
1 INJECTION INTRAMUSCULAR
Status: DISCONTINUED | OUTPATIENT
Start: 2022-08-15 | End: 2022-08-17 | Stop reason: HOSPADM

## 2022-08-15 RX ORDER — PREDNISOLONE ACETATE 10 MG/ML
1 SUSPENSION/ DROPS OPHTHALMIC 4 TIMES DAILY
Qty: 10 ML | Refills: 1 | Status: SHIPPED | OUTPATIENT
Start: 2022-08-15 | End: 2024-03-14

## 2022-08-15 RX ORDER — EPHEDRINE SULFATE 50 MG/ML
INJECTION, SOLUTION INTRAMUSCULAR; INTRAVENOUS; SUBCUTANEOUS PRN
Status: DISCONTINUED | OUTPATIENT
Start: 2022-08-15 | End: 2022-08-15

## 2022-08-15 RX ORDER — SODIUM CHLORIDE, SODIUM LACTATE, POTASSIUM CHLORIDE, CALCIUM CHLORIDE 600; 310; 30; 20 MG/100ML; MG/100ML; MG/100ML; MG/100ML
INJECTION, SOLUTION INTRAVENOUS CONTINUOUS
Status: DISCONTINUED | OUTPATIENT
Start: 2022-08-15 | End: 2022-08-17 | Stop reason: HOSPADM

## 2022-08-15 RX ORDER — HYDRALAZINE HYDROCHLORIDE 20 MG/ML
2.5-5 INJECTION INTRAMUSCULAR; INTRAVENOUS EVERY 10 MIN PRN
Status: DISCONTINUED | OUTPATIENT
Start: 2022-08-15 | End: 2022-08-15 | Stop reason: HOSPADM

## 2022-08-15 RX ORDER — TETRACAINE HYDROCHLORIDE 5 MG/ML
SOLUTION OPHTHALMIC PRN
Status: DISCONTINUED | OUTPATIENT
Start: 2022-08-15 | End: 2022-08-15 | Stop reason: HOSPADM

## 2022-08-15 RX ORDER — FENTANYL CITRATE 50 UG/ML
25 INJECTION, SOLUTION INTRAMUSCULAR; INTRAVENOUS EVERY 5 MIN PRN
Status: DISCONTINUED | OUTPATIENT
Start: 2022-08-15 | End: 2022-08-15 | Stop reason: HOSPADM

## 2022-08-15 RX ORDER — CYCLOPENTOLAT/TROPIC/PHENYLEPH 1%-1%-2.5%
1 DROPS (EA) OPHTHALMIC (EYE)
Status: COMPLETED | OUTPATIENT
Start: 2022-08-15 | End: 2022-08-15

## 2022-08-15 RX ORDER — PROPOFOL 10 MG/ML
INJECTION, EMULSION INTRAVENOUS CONTINUOUS PRN
Status: DISCONTINUED | OUTPATIENT
Start: 2022-08-15 | End: 2022-08-15

## 2022-08-15 RX ORDER — DEXAMETHASONE SODIUM PHOSPHATE 4 MG/ML
INJECTION, SOLUTION INTRA-ARTICULAR; INTRALESIONAL; INTRAMUSCULAR; INTRAVENOUS; SOFT TISSUE PRN
Status: DISCONTINUED | OUTPATIENT
Start: 2022-08-15 | End: 2022-08-15

## 2022-08-15 RX ORDER — ONDANSETRON 4 MG/1
4 TABLET, ORALLY DISINTEGRATING ORAL EVERY 30 MIN PRN
Status: DISCONTINUED | OUTPATIENT
Start: 2022-08-15 | End: 2022-08-17 | Stop reason: HOSPADM

## 2022-08-15 RX ORDER — HYDROMORPHONE HYDROCHLORIDE 1 MG/ML
0.2 INJECTION, SOLUTION INTRAMUSCULAR; INTRAVENOUS; SUBCUTANEOUS EVERY 5 MIN PRN
Status: DISCONTINUED | OUTPATIENT
Start: 2022-08-15 | End: 2022-08-15 | Stop reason: HOSPADM

## 2022-08-15 RX ORDER — ONDANSETRON 2 MG/ML
4 INJECTION INTRAMUSCULAR; INTRAVENOUS EVERY 30 MIN PRN
Status: DISCONTINUED | OUTPATIENT
Start: 2022-08-15 | End: 2022-08-17 | Stop reason: HOSPADM

## 2022-08-15 RX ORDER — KETOROLAC TROMETHAMINE 4 MG/ML
1 SOLUTION/ DROPS OPHTHALMIC 4 TIMES DAILY
Qty: 5 ML | Refills: 1 | Status: SHIPPED | OUTPATIENT
Start: 2022-08-15 | End: 2024-03-14

## 2022-08-15 RX ORDER — PROPOFOL 10 MG/ML
INJECTION, EMULSION INTRAVENOUS PRN
Status: DISCONTINUED | OUTPATIENT
Start: 2022-08-15 | End: 2022-08-15

## 2022-08-15 RX ORDER — MOXIFLOXACIN IN NACL,ISO-OS/PF 0.3MG/0.3
SYRINGE (ML) INTRAOCULAR PRN
Status: DISCONTINUED | OUTPATIENT
Start: 2022-08-15 | End: 2022-08-15 | Stop reason: HOSPADM

## 2022-08-15 RX ORDER — BALANCED SALT SOLUTION 6.4; .75; .48; .3; 3.9; 1.7 MG/ML; MG/ML; MG/ML; MG/ML; MG/ML; MG/ML
SOLUTION OPHTHALMIC PRN
Status: DISCONTINUED | OUTPATIENT
Start: 2022-08-15 | End: 2022-08-15 | Stop reason: HOSPADM

## 2022-08-15 RX ORDER — TIMOLOL MALEATE 5 MG/ML
SOLUTION/ DROPS OPHTHALMIC PRN
Status: DISCONTINUED | OUTPATIENT
Start: 2022-08-15 | End: 2022-08-15 | Stop reason: HOSPADM

## 2022-08-15 RX ORDER — PROPARACAINE HYDROCHLORIDE 5 MG/ML
1 SOLUTION/ DROPS OPHTHALMIC ONCE
Status: COMPLETED | OUTPATIENT
Start: 2022-08-15 | End: 2022-08-15

## 2022-08-15 RX ORDER — FENTANYL CITRATE 50 UG/ML
25 INJECTION, SOLUTION INTRAMUSCULAR; INTRAVENOUS
Status: DISCONTINUED | OUTPATIENT
Start: 2022-08-15 | End: 2022-08-17 | Stop reason: HOSPADM

## 2022-08-15 RX ORDER — DEXAMETHASONE SODIUM PHOSPHATE 4 MG/ML
INJECTION, SOLUTION INTRA-ARTICULAR; INTRALESIONAL; INTRAMUSCULAR; INTRAVENOUS; SOFT TISSUE PRN
Status: DISCONTINUED | OUTPATIENT
Start: 2022-08-15 | End: 2022-08-15 | Stop reason: HOSPADM

## 2022-08-15 RX ORDER — FENTANYL CITRATE 50 UG/ML
INJECTION, SOLUTION INTRAMUSCULAR; INTRAVENOUS PRN
Status: DISCONTINUED | OUTPATIENT
Start: 2022-08-15 | End: 2022-08-15

## 2022-08-15 RX ORDER — ONDANSETRON 2 MG/ML
INJECTION INTRAMUSCULAR; INTRAVENOUS PRN
Status: DISCONTINUED | OUTPATIENT
Start: 2022-08-15 | End: 2022-08-15

## 2022-08-15 RX ORDER — MOXIFLOXACIN 5 MG/ML
1 SOLUTION/ DROPS OPHTHALMIC 3 TIMES DAILY
Qty: 3 ML | Refills: 1 | Status: SHIPPED | OUTPATIENT
Start: 2022-08-15 | End: 2024-03-14

## 2022-08-15 RX ORDER — SODIUM CHLORIDE, SODIUM LACTATE, POTASSIUM CHLORIDE, CALCIUM CHLORIDE 600; 310; 30; 20 MG/100ML; MG/100ML; MG/100ML; MG/100ML
INJECTION, SOLUTION INTRAVENOUS CONTINUOUS
Status: DISCONTINUED | OUTPATIENT
Start: 2022-08-15 | End: 2022-08-15 | Stop reason: HOSPADM

## 2022-08-15 RX ADMIN — EPHEDRINE SULFATE 5 MG: 50 INJECTION, SOLUTION INTRAMUSCULAR; INTRAVENOUS; SUBCUTANEOUS at 09:30

## 2022-08-15 RX ADMIN — PROPOFOL 150 MCG/KG/MIN: 10 INJECTION, EMULSION INTRAVENOUS at 09:13

## 2022-08-15 RX ADMIN — PROPARACAINE HYDROCHLORIDE 1 DROP: 5 SOLUTION/ DROPS OPHTHALMIC at 07:18

## 2022-08-15 RX ADMIN — Medication 1 DROP: at 07:21

## 2022-08-15 RX ADMIN — Medication 100 MCG: at 09:22

## 2022-08-15 RX ADMIN — PROPOFOL 150 MG: 10 INJECTION, EMULSION INTRAVENOUS at 09:12

## 2022-08-15 RX ADMIN — ACETAMINOPHEN 975 MG: 325 TABLET ORAL at 07:25

## 2022-08-15 RX ADMIN — SODIUM CHLORIDE, SODIUM LACTATE, POTASSIUM CHLORIDE, CALCIUM CHLORIDE: 600; 310; 30; 20 INJECTION, SOLUTION INTRAVENOUS at 07:21

## 2022-08-15 RX ADMIN — PROPOFOL 50 MG: 10 INJECTION, EMULSION INTRAVENOUS at 09:13

## 2022-08-15 RX ADMIN — Medication 1 DROP: at 07:18

## 2022-08-15 RX ADMIN — LIDOCAINE HYDROCHLORIDE 100 MG: 20 INJECTION, SOLUTION INFILTRATION; PERINEURAL at 09:12

## 2022-08-15 RX ADMIN — ONDANSETRON 4 MG: 2 INJECTION INTRAMUSCULAR; INTRAVENOUS at 09:22

## 2022-08-15 RX ADMIN — Medication 100 MCG: at 09:28

## 2022-08-15 RX ADMIN — DEXAMETHASONE SODIUM PHOSPHATE 4 MG: 4 INJECTION, SOLUTION INTRA-ARTICULAR; INTRALESIONAL; INTRAMUSCULAR; INTRAVENOUS; SOFT TISSUE at 09:22

## 2022-08-15 RX ADMIN — Medication 1 DROP: at 07:25

## 2022-08-15 RX ADMIN — FENTANYL CITRATE 50 MCG: 50 INJECTION, SOLUTION INTRAMUSCULAR; INTRAVENOUS at 09:12

## 2022-08-15 ASSESSMENT — TONOMETRY: OS_IOP_MMHG: 17

## 2022-08-15 ASSESSMENT — LIFESTYLE VARIABLES: TOBACCO_USE: 1

## 2022-08-15 ASSESSMENT — VISUAL ACUITY: METHOD: SNELLEN - LINEAR

## 2022-08-15 ASSESSMENT — SLIT LAMP EXAM - LIDS: COMMENTS: NORMAL

## 2022-08-15 NOTE — ANESTHESIA PREPROCEDURE EVALUATION
Anesthesia Pre-Procedure Evaluation    Patient: Elver Ruelas   MRN: [id number] : 1950        Procedure : Procedure(s):  LEFT EYE PHACOEMULSIFICATION, COMPLEX CATARACT, WITH STANDARD INTRAOCULAR LENS IMPLANT INSERTION          Past Medical History:   Diagnosis Date     Ex-smoker      Hyperlipidemia LDL goal < 160       History reviewed. No pertinent surgical history.   No Known Allergies   Social History     Tobacco Use     Smoking status: Never Smoker     Smokeless tobacco: Never Used   Substance Use Topics     Alcohol use: Yes     Comment: 2/yr      Wt Readings from Last 1 Encounters:   08/15/22 60.3 kg (133 lb)        Anesthesia Evaluation   Pt has not had prior anesthetic         ROS/MED HX  ENT/Pulmonary:     (+) FRANCIE risk factors, snores loudly, hypertension, tobacco use, Past use,     Neurologic:  - neg neurologic ROS     Cardiovascular:     (+) Dyslipidemia hypertension-----Taking blood thinners     METS/Exercise Tolerance: >4 METS Comment: Going for walks, leg and arm exercises. 2 hours daily   Hematologic:  - neg hematologic  ROS  (-) history of blood clots and history of blood transfusion   Musculoskeletal:  - neg musculoskeletal ROS     GI/Hepatic:     (+) liver disease (heatic steatosis ),     Renal/Genitourinary:  - neg Renal ROS     Endo:  - neg endo ROS  (-) chronic steroid usage   Psychiatric/Substance Use:  - neg psychiatric ROS     Infectious Disease:  - neg infectious disease ROS     Malignancy:  - neg malignancy ROS     Other:            Physical Exam    Airway        Mallampati: II    Neck ROM: full     Respiratory Devices and Support         Dental           Cardiovascular          Rhythm and rate: regular     Pulmonary           breath sounds clear to auscultation           OUTSIDE LABS:  CBC:   Lab Results   Component Value Date    WBC 8.0 2016    HGB 13.1 (L) 2016    HGB 15.2 2012    HCT 39.4 (L) 2016     2016     BMP:   Lab Results   Component  Value Date     06/17/2022     03/30/2016    POTASSIUM 4.3 06/17/2022    POTASSIUM 4.2 03/30/2016    CHLORIDE 104 06/17/2022    CHLORIDE 96 03/30/2016    CO2 28 06/17/2022    CO2 27 03/30/2016    BUN 21 06/17/2022    BUN 20 03/30/2016    CR 0.65 (L) 06/17/2022    CR 0.71 03/30/2016     (H) 06/17/2022     (H) 03/30/2016     COAGS:   Lab Results   Component Value Date    INR 0.92 03/30/2016     POC: No results found for: BGM, HCG, HCGS  HEPATIC:   Lab Results   Component Value Date    ALBUMIN 4.2 06/17/2022    PROTTOTAL 7.3 06/17/2022    ALT 34 06/17/2022    AST 30 06/17/2022    ALKPHOS 51 06/17/2022    BILITOTAL 0.5 06/17/2022     OTHER:   Lab Results   Component Value Date    DREA 8.9 06/17/2022       Anesthesia Plan    ASA Status:  2   NPO Status:  NPO Appropriate    Anesthesia Type: General.     - Airway: LMA   Induction: Intravenous.   Maintenance: TIVA.        Consents    Anesthesia Plan(s) and associated risks, benefits, and realistic alternatives discussed. Questions answered and patient/representative(s) expressed understanding.    - Discussed:     - Discussed with:  Patient,          Postoperative Care    Pain management: Oral pain medications, Multi-modal analgesia.   PONV prophylaxis: Ondansetron (or other 5HT-3), Dexamethasone or Solumedrol     Comments:                Blayne Richards MD

## 2022-08-15 NOTE — ANESTHESIA CARE TRANSFER NOTE
Patient: Elver Ruelas    Procedure: Procedure(s):  LEFT EYE PHACOEMULSIFICATION, CATARACT, WITH STANDARD INTRAOCULAR LENS IMPLANT INSERTION       Diagnosis: Chronic angle-closure glaucoma of left eye, mild stage [H40.2221]  Mixed type age-related cataract, both eyes [H25.813]  Diagnosis Additional Information: No value filed.    Anesthesia Type:   General     Note:      Level of Consciousness: awake  Oxygen Supplementation: room air    Independent Airway: airway patency satisfactory and stable  Dentition: dentition unchanged  Vital Signs Stable: post-procedure vital signs reviewed and stable  Report to RN Given: handoff report given  Patient transferred to: PACU    Handoff Report: Identifed the Patient, Identified the Reponsible Provider, Reviewed the pertinent medical history, Discussed the surgical course, Reviewed Intra-OP anesthesia mangement and issues during anesthesia, Set expectations for post-procedure period and Allowed opportunity for questions and acknowledgement of understanding      Vitals:  Vitals Value Taken Time   /47 08/15/22 0948   Temp 36.1  C (96.9  F) 08/15/22 0948   Pulse 61 08/15/22 0950   Resp 18 08/15/22 0950   SpO2 98 % 08/15/22 0950   Vitals shown include unvalidated device data.    Electronically Signed By: MELANIE Dudley CRNA  August 15, 2022  9:50 AM

## 2022-08-15 NOTE — DISCHARGE INSTRUCTIONS
[facility]  Home Care Following Anesthesia  For 24 hours after surgery:  Get plenty of rest.  A responsible adult must stay with you for at least 24 hours after you leave the surgery center.  Do not drive or use heavy equipment.  If you have weakness or tingling, don't drive or use heavy equipment until this feeling goes away.   Do not drink alcohol.   Avoid strenuous or risky activities.  Ask for help when climbing stairs.  You may feel lightheaded.  IF so, sit for a few minutes before standing.  Have someone help you get up.   If you have nausea (feel sick to your stomach): Drink only clear liquids such as apple juice, ginger ale, broth or 7-Up.  Rest may also help.  Be sure to drink enough fluids.  Move to a regular diet as you feel able.   You may have a slight fever.  Call the doctor if your fever is over 100 F (37.7 C) (taken under the tongue) or lasts longer than 24 hours.  You may have a dry mouth, a sore throat, muscle aches or trouble sleeping. These should go away after 24 hours.  Do not make important or legal decisions.   It is recommended to avoid smoking.               Tips for taking pain medications  To get the best pain relief possible, remember these points:  Take pain medications as directed, before pain becomes severe.  Pain medication can upset your stomach: taking it with food may help.  Constipation is a common side effect of pain medication. Drink plenty of  fluids.  Eat foods high in fiber. Take a stool softener if recommended by your doctor or pharmacist.  Do not drink alcohol, drive or operate machinery while taking pain medications.  Ask about other ways to control pain, such as with heat, ice or relaxation.    Tylenol/Acetaminophen Consumption  To help encourage the safe use of acetaminophen, the makers of TYLENOL  have lowered the maximum daily dose for single-ingredient Extra Strength TYLENOL  (acetaminophen) products sold in the [place] from 8 pills  per day (4,000 mg) to 6 pills per day (3,000 mg). The dosing interval has also changed from 2 pills every 4-6 hours to 2 pills every 6 hours.  If you feel your pain relief is insufficient, you may take Tylenol/Acetaminophen in addition to your narcotic pain medication.   Be careful not to exceed 3,000 mg of Tylenol/Acetaminophen in a 24 hour period from all sources.  If you are taking extra strength Tylenol/acetaminophen (500 mg), the maximum dose is 6 tablets in 24 hours.  If you are taking regular strength acetaminophen (325 mg), the maximum dose is 9 tablets in 24 hours.    Call a doctor for any of the following:  Signs of infection (fever, growing tenderness at the surgery site, a large amount of drainage or bleeding, severe pain, foul-smelling drainage, redness, swelling).  It has been over 8 to 10 hours since surgery and you are still not able to urinate (pass water).  Headache for over 24 hours.  Numbness, tingling or weakness the day after surgery (if you had spinal anesthesia).  Signs of Covid-19 infection (temperature over 100 degrees, shortness of breath, cough, loss of taste/smell, generalized body aches, persistent headache, chills, sore throat, nausea/vomiting/diarrhea)  Your doctor is:       Dr. Rosalio Aguilar, Ophthalmology: [phone]               Or dial [phone] and ask for the resident on call for:  Ophthalmology  For emergency care, call the:  [facility]:  [phone] (TTY for hearing impaired: [phone])

## 2022-08-15 NOTE — PROGRESS NOTES
POD#0, status post cataract surgery, left eye     No complaints.  Denies eye pain.    Va sc 20/20 @ 3 ft     IOP 17 mm Hg by applanation     Impression/Plan:  Pseudophakia, OS: POD0, good post-operative appearance. IOP reasonable.    Eye protection at all times and eye shield at night for 1 week.    Limited activities with no exercise or heavy lifting for 1 week.    Instructed patient to contact us for decreasing vision, eye pain, new floaters or flashes of light or other concerning symptoms.    Written instructions given  Return to clinic 9/7/22.    Rainer Machuca MD - PGY4  Department of Ophthalmology  Pager: [phone]    Attending Physician Attestation:  Complete documentation of historical and exam elements from today's encounter can be found in the full encounter summary report (not reduplicated in this progress note).  I personally obtained the chief complaint(s) and history of present illness.  I confirmed and edited as necessary the review of systems, past medical/surgical history, family history, social history, and examination findings as documented by others; and I examined the patient myself.  I personally reviewed the relevant tests, images, and reports as documented above.  I formulated and edited as necessary the assessment and plan and discussed the findings and management plan with the patient and family. . - Rosalio Aguilar MD

## 2022-08-15 NOTE — OP NOTE
PREOPERATIVE DIAGNOSIS:   1. Chronic angle-closure glaucoma of left eye, mild stage    2. Mixed type age-related cataract, both eyes      POSTOPERATIVE DIAGNOSIS: Same   PROCEDURES:   1. Cataract extraction with intraocular lens implant Left eye  2. Goniosynechialysis, left eye   SURGEON: Rosalio Aguilar M.D.  Assistant: Rainer Machuca MD   INDICATIONS: The patient Elver Ruelas presented to the eye clinic with decreased vision secondary to cataract in the Left eye. The risks, benefits and alternatives to cataract extraction were discussed. The patient elected to proceed. All questions were answered to the patient's satisfaction.   DESCRIPTION OF PROCEDURE:   Prior to the procedure, appropriate cardiac and respiratory monitors were applied to the patient.  In the pre-operative holding area, a drop of topical tetracaine followed by lidocaine gel followed by povidone iodine.  The patient was brought to the operating room where a surgical pause was carried out to identify with all members of the surgical team the correct surgical site.  The patient was placed under general anesthesia and the airway secured. With adequate anesthesia, the Left eye was prepped and draped in the usual sterile fashion. A lid speculum was placed, and the operating microscope was rotated into position. A paracentesis was created.  Through this limbal paracentesis, the anterior chamber was filled with preservative-free lidocaine followed by viscoelastic.  A temporal wound was created at the limbus using a 2.6 mm blade. A capsulorrhexis was initiated using a bent 25-gauge needle and was completed in continuous and circular fashion using the capsulorrhexis forceps. The lens nucleus was hydrodissected using balanced salt solution.  The lens nucleus was rotated and removed using phacoemulsification in a stop and chop technique.  Residual cortical material was removed using irrigation-aspiration.  The capsular bag was reinflated to its maximal  extent with cohesive viscoelastic.  A 25.0 diopter DIBOO inserted into the capsular bag.  The lens power selected was reviewed using the intraocular lens power measurements that were obtained preoperatively to confirm that the correct lens was selected for the desired post-operative refractive state. An Ahmed prism was used to visualize the angle anatomy for 360 degrees.  Deepening was noted for 270 degrees with visualization of the scleral spur and some scattered fine peripheral anterior synechiae.  Broad peripheral anterior synechiae was seen superiorly and gently released using a cyclodialysis spatula.  The residual viscoelastic was removed in its entirety, the wound were hydrated and found to be self-sealing.  Intracameral moxifloxacin was administered. Tactile pressure was confirmed to be in a normal range.  Subconjunctival Dexamethasone (2 mg) was injected.. The lid speculum was removed and a patch and shield were applied.  The patient tolerated the procedure well, and there were no complications.    PLAN: The patient will be discharged to home and will follow up tomorrow morning in the eye clinic.  EBL:  None  Complications:  None  Implant Name Type Inv. Item Serial No.  Lot No. LRB No. Used Action   Tecnis Eyhance IOL  [id number]  +25.0D Lens/Eye Implant  [id number]   Left 1 Implanted         Attending Physician Procedure Attestation: I was present for the entire procedure       Rosalio Aguilar MD  , Comprehensive Ophthalmology  Department of Ophthalmology and Visual Neurosciences  [facility]

## 2022-08-15 NOTE — ANESTHESIA POSTPROCEDURE EVALUATION
Patient: Elver Ruelas    Procedure: Procedure(s):  LEFT EYE PHACOEMULSIFICATION, CATARACT, WITH STANDARD INTRAOCULAR LENS IMPLANT INSERTION       Anesthesia Type:  General    Note:  Disposition: Outpatient   Postop Pain Control: Uneventful            Sign Out: Well controlled pain   PONV: No   Neuro/Psych: Uneventful            Sign Out: Acceptable/Baseline neuro status   Airway/Respiratory: Uneventful            Sign Out: Acceptable/Baseline resp. status   CV/Hemodynamics: Uneventful            Sign Out: Acceptable CV status; No obvious hypovolemia; No obvious fluid overload   Other NRE: NONE   DID A NON-ROUTINE EVENT OCCUR? No           Last vitals:  Vitals Value Taken Time   /73 08/15/22 1000   Temp 36.1  C (96.9  F) 08/15/22 1000   Pulse 67 08/15/22 1003   Resp 18 08/15/22 1003   SpO2 96 % 08/15/22 1003   Vitals shown include unvalidated device data.    Electronically Signed By: Blayne Richards MD  August 15, 2022  10:53 AM

## 2022-08-16 ENCOUNTER — TELEPHONE (OUTPATIENT)
Dept: OPHTHALMOLOGY | Facility: CLINIC | Age: 72
End: 2022-08-16

## 2022-08-16 NOTE — TELEPHONE ENCOUNTER
I spoke to the patient's daughter who reports a left red eye.  The patient has no eye pain or vision change.  I explained for the patient to call if he develops pain or vision change.  The redness should resolve slowly in the next week.  The patient's daughter expresses understanding.    .

## 2022-09-03 ENCOUNTER — HEALTH MAINTENANCE LETTER (OUTPATIENT)
Age: 72
End: 2022-09-03

## 2022-09-15 ENCOUNTER — OFFICE VISIT (OUTPATIENT)
Dept: OPHTHALMOLOGY | Facility: CLINIC | Age: 72
End: 2022-09-15
Attending: OPHTHALMOLOGY
Payer: MEDICARE

## 2022-09-15 DIAGNOSIS — H35.031 BLIND HYPERTENSIVE EYE, RIGHT: ICD-10-CM

## 2022-09-15 DIAGNOSIS — Z96.1 PSEUDOPHAKIA: Primary | ICD-10-CM

## 2022-09-15 PROCEDURE — 99024 POSTOP FOLLOW-UP VISIT: CPT | Performed by: OPHTHALMOLOGY

## 2022-09-15 PROCEDURE — 92015 DETERMINE REFRACTIVE STATE: CPT | Mod: GY

## 2022-09-15 PROCEDURE — G0463 HOSPITAL OUTPT CLINIC VISIT: HCPCS | Mod: 25

## 2022-09-15 ASSESSMENT — REFRACTION_MANIFEST
OS_CYLINDER: +0.50
OS_AXIS: 065
OS_ADD: +2.50
OD_SPHERE: BALANCE
OS_SPHERE: -0.50

## 2022-09-15 ASSESSMENT — VISUAL ACUITY
OS_SC: 20/25
OD_SC: NLP
METHOD: SNELLEN - LINEAR

## 2022-09-15 ASSESSMENT — TONOMETRY
OS_IOP_MMHG: 14
IOP_METHOD: TONOPEN
OD_IOP_MMHG: 32

## 2022-09-15 ASSESSMENT — EXTERNAL EXAM - LEFT EYE: OS_EXAM: NORMAL

## 2022-09-15 ASSESSMENT — EXTERNAL EXAM - RIGHT EYE: OD_EXAM: NORMAL

## 2022-09-15 ASSESSMENT — CONF VISUAL FIELD
OD_INFERIOR_TEMPORAL_RESTRICTION: 1
OD_SUPERIOR_TEMPORAL_RESTRICTION: 1
OS_NORMAL: 1
OD_INFERIOR_NASAL_RESTRICTION: 1
OD_SUPERIOR_NASAL_RESTRICTION: 1

## 2022-09-15 ASSESSMENT — CUP TO DISC RATIO: OS_RATIO: 0.3

## 2022-09-15 ASSESSMENT — SLIT LAMP EXAM - LIDS
COMMENTS: NORMAL
COMMENTS: NORMAL

## 2022-09-15 NOTE — NURSING NOTE
Chief Complaints and History of Present Illnesses   Patient presents with     Post Op (Ophthalmology) Left Eye     1 month follow up s/p CE/IOL LE 08/15/222     Chief Complaint(s) and History of Present Illness(es)     Post Op (Ophthalmology) Left Eye     Comments: 1 month follow up s/p CE/IOL LE 08/15/222              Comments     Pt here with  over the phone today.  Pt states vision has been good in LE since surgery. No new flashes or floaters.  No eye pain today. No redness or dryness.    LARRY Eubanks September 15, 2022 10:01 AM

## 2022-09-15 NOTE — PROGRESS NOTES
Chief Complaint(s) and History of Present Illness(es)     Post Op (Ophthalmology) Left Eye     Comments: 1 month follow up s/p CE/IOL LE 08/15/222              Comments     Pt here with  over the phone today.  Pt states vision has been good in LE since surgery. No new flashes or   floaters.  No eye pain today. No redness or dryness.    LARRY Eubanks September 15, 2022 10:01 AM                  Review of systems for the eyes was negative other than the pertinent positives/negatives listed in the HPI.      Assessment & Plan      Elver Ruelas is a 71 year old male with the following diagnoses:   1. Pseudophakia - Left Eye    2. Blind hypertensive eye, right           Doing well  Ok to resume normal activities  Taper Predforte as directed  Glasses prescription updated  Full time monocular precaution encouraged  Artificial tears as needed          Patient disposition:   Return in about 1 year (around 9/15/2023) for DFE.           Attending Physician Attestation:  Complete documentation of historical and exam elements from today's encounter can be found in the full encounter summary report (not reduplicated in this progress note).  I personally obtained the chief complaint(s) and history of present illness.  I confirmed and edited as necessary the review of systems, past medical/surgical history, family history, social history, and examination findings as documented by others; and I examined the patient myself.  I personally reviewed the relevant tests, images, and reports as documented above.  I formulated and edited as necessary the assessment and plan and discussed the findings and management plan with the patient and family. . - Rosalio Aguilar MD

## 2023-06-03 ENCOUNTER — HEALTH MAINTENANCE LETTER (OUTPATIENT)
Age: 73
End: 2023-06-03

## 2023-06-22 ENCOUNTER — LAB REQUISITION (OUTPATIENT)
Dept: LAB | Facility: CLINIC | Age: 73
End: 2023-06-22
Payer: MEDICARE

## 2023-06-22 DIAGNOSIS — Z00.00 ENCOUNTER FOR GENERAL ADULT MEDICAL EXAMINATION WITHOUT ABNORMAL FINDINGS: ICD-10-CM

## 2023-06-22 LAB
ALBUMIN SERPL BCG-MCNC: 4.5 G/DL (ref 3.5–5.2)
ALP SERPL-CCNC: 57 U/L (ref 40–129)
ALT SERPL W P-5'-P-CCNC: 29 U/L (ref 0–70)
ANION GAP SERPL CALCULATED.3IONS-SCNC: 11 MMOL/L (ref 7–15)
AST SERPL W P-5'-P-CCNC: 27 U/L (ref 0–45)
BILIRUB SERPL-MCNC: 0.3 MG/DL
BUN SERPL-MCNC: 15.3 MG/DL (ref 8–23)
CALCIUM SERPL-MCNC: 9.2 MG/DL (ref 8.8–10.2)
CHLORIDE SERPL-SCNC: 103 MMOL/L (ref 98–107)
CHOLEST SERPL-MCNC: 159 MG/DL
CREAT SERPL-MCNC: 0.75 MG/DL (ref 0.67–1.17)
DEPRECATED HCO3 PLAS-SCNC: 24 MMOL/L (ref 22–29)
GFR SERPL CREATININE-BSD FRML MDRD: >90 ML/MIN/1.73M2
GLUCOSE SERPL-MCNC: 112 MG/DL (ref 70–99)
HBA1C MFR BLD: 5.7 %
HDLC SERPL-MCNC: 62 MG/DL
LDLC SERPL CALC-MCNC: 86 MG/DL
NONHDLC SERPL-MCNC: 97 MG/DL
POTASSIUM SERPL-SCNC: 4.2 MMOL/L (ref 3.4–5.3)
PROT SERPL-MCNC: 7 G/DL (ref 6.4–8.3)
SODIUM SERPL-SCNC: 138 MMOL/L (ref 136–145)
TRIGL SERPL-MCNC: 54 MG/DL
TSH SERPL DL<=0.005 MIU/L-ACNC: 1.91 UIU/ML (ref 0.3–4.2)
VIT B12 SERPL-MCNC: 804 PG/ML (ref 232–1245)

## 2023-06-22 PROCEDURE — 83036 HEMOGLOBIN GLYCOSYLATED A1C: CPT | Mod: ORL | Performed by: FAMILY MEDICINE

## 2023-06-22 PROCEDURE — 80053 COMPREHEN METABOLIC PANEL: CPT | Mod: ORL | Performed by: FAMILY MEDICINE

## 2023-06-22 PROCEDURE — 82607 VITAMIN B-12: CPT | Mod: ORL | Performed by: FAMILY MEDICINE

## 2023-06-22 PROCEDURE — 84443 ASSAY THYROID STIM HORMONE: CPT | Mod: ORL | Performed by: FAMILY MEDICINE

## 2023-06-22 PROCEDURE — 80061 LIPID PANEL: CPT | Mod: ORL | Performed by: FAMILY MEDICINE

## 2023-06-27 ENCOUNTER — TRANSCRIBE ORDERS (OUTPATIENT)
Dept: OTHER | Age: 73
End: 2023-06-27

## 2023-06-27 DIAGNOSIS — M54.42 ACUTE LOW BACK PAIN WITH LEFT-SIDED SCIATICA, UNSPECIFIED BACK PAIN LATERALITY: ICD-10-CM

## 2023-06-27 DIAGNOSIS — M25.562 LEFT KNEE PAIN, UNSPECIFIED CHRONICITY: Primary | ICD-10-CM

## 2023-06-27 DIAGNOSIS — R41.89 ACUTE COGNITIVE DECLINE: Primary | ICD-10-CM

## 2023-06-30 ENCOUNTER — TELEPHONE (OUTPATIENT)
Dept: OPHTHALMOLOGY | Facility: CLINIC | Age: 73
End: 2023-06-30
Payer: MEDICARE

## 2023-06-30 NOTE — TELEPHONE ENCOUNTER
Rescheduled back into the 1030 ROSINA time slot on 9-    Jamie Nye RN 4:59 PM 06/30/23            [facility] Call Center    Phone Message    May a detailed message be left on voicemail: yes     Reason for Call: Other: Bhavana with Imaging scheduling with [facility] [employer] calling in that she accidentally cancelled patients eye apt with Dr Aguilar on 9/14 at 10:30am 5 min ago and writer unable to schedule for that date and time again and next opening is 10/3. Writer wasn't sure if patient was double booked in. Bhavana can be reached at [phone]. Thank you.    Action Taken: Message routed to:  [facility] Eye    Travel Screening: Not Applicable

## 2023-07-22 ENCOUNTER — HOSPITAL ENCOUNTER (OUTPATIENT)
Dept: MRI IMAGING | Facility: CLINIC | Age: 73
Discharge: HOME | End: 2023-07-22
Attending: FAMILY MEDICINE | Admitting: FAMILY MEDICINE
Payer: MEDICARE

## 2023-07-22 DIAGNOSIS — M54.42 ACUTE LOW BACK PAIN WITH LEFT-SIDED SCIATICA, UNSPECIFIED BACK PAIN LATERALITY: ICD-10-CM

## 2023-07-22 PROCEDURE — 72148 MRI LUMBAR SPINE W/O DYE: CPT

## 2023-09-14 ENCOUNTER — OFFICE VISIT (OUTPATIENT)
Dept: OPHTHALMOLOGY | Facility: CLINIC | Age: 73
End: 2023-09-14
Attending: OPHTHALMOLOGY
Payer: MEDICARE

## 2023-09-14 DIAGNOSIS — H25.813 MIXED TYPE AGE-RELATED CATARACT, BOTH EYES: ICD-10-CM

## 2023-09-14 DIAGNOSIS — H40.2221 CHRONIC ANGLE-CLOSURE GLAUCOMA OF LEFT EYE, MILD STAGE: ICD-10-CM

## 2023-09-14 DIAGNOSIS — H35.031 BLIND HYPERTENSIVE EYE, RIGHT: ICD-10-CM

## 2023-09-14 DIAGNOSIS — Z96.1 PSEUDOPHAKIA: Primary | ICD-10-CM

## 2023-09-14 PROCEDURE — G0463 HOSPITAL OUTPT CLINIC VISIT: HCPCS | Performed by: OPHTHALMOLOGY

## 2023-09-14 PROCEDURE — 92014 COMPRE OPH EXAM EST PT 1/>: CPT | Mod: GC | Performed by: OPHTHALMOLOGY

## 2023-09-14 PROCEDURE — 92015 DETERMINE REFRACTIVE STATE: CPT | Mod: GY

## 2023-09-14 ASSESSMENT — REFRACTION_WEARINGRX
OS_AXIS: 065
OD_SPHERE: BALANCE
OS_CYLINDER: +0.50
OS_SPHERE: -0.50
OS_ADD: +2.50

## 2023-09-14 ASSESSMENT — TONOMETRY
OS_IOP_MMHG: 10
IOP_METHOD: ICARE
OD_IOP_MMHG: 33

## 2023-09-14 ASSESSMENT — CUP TO DISC RATIO: OS_RATIO: 0.3

## 2023-09-14 ASSESSMENT — CONF VISUAL FIELD
OD_INFERIOR_TEMPORAL_RESTRICTION: 1
OD_INFERIOR_NASAL_RESTRICTION: 1
OS_NORMAL: 1
OS_INFERIOR_NASAL_RESTRICTION: 0
OD_SUPERIOR_NASAL_RESTRICTION: 1
OS_SUPERIOR_NASAL_RESTRICTION: 0
OD_SUPERIOR_TEMPORAL_RESTRICTION: 1
OS_INFERIOR_TEMPORAL_RESTRICTION: 0
OS_SUPERIOR_TEMPORAL_RESTRICTION: 0
METHOD: COUNTING FINGERS

## 2023-09-14 ASSESSMENT — REFRACTION_MANIFEST
OS_AXIS: 045
OS_CYLINDER: +0.50
OD_SPHERE: BALANCE
OS_SPHERE: -0.75
OS_ADD: +2.50

## 2023-09-14 ASSESSMENT — EXTERNAL EXAM - LEFT EYE: OS_EXAM: NORMAL

## 2023-09-14 ASSESSMENT — VISUAL ACUITY
METHOD: SNELLEN - LINEAR
OS_SC: 20/50
OD_SC: NLP
OS_SC+: -1

## 2023-09-14 ASSESSMENT — EXTERNAL EXAM - RIGHT EYE: OD_EXAM: NORMAL

## 2023-09-14 ASSESSMENT — SLIT LAMP EXAM - LIDS
COMMENTS: DCL
COMMENTS: DCL

## 2023-09-14 NOTE — NURSING NOTE
Chief Complaints and History of Present Illnesses   Patient presents with    COMPREHENSIVE EYE EXAM     Chief Complaint(s) and History of Present Illness(es)       COMPREHENSIVE EYE EXAM              Laterality: both eyes    Course: gradually worsening              Comments    Elver Ruelas is a(n) 72 year old male who presents for a comprehensive exam. Last eye exam was 1 year(s) ago. Since exam, vision has worsened. Uses lubricating drops. No flashes and floaters. No eye pain.     Lab Results       Component                Value               Date                       A1C                      5.7                 06/22/2023              Joshua Parada COT 9:39 AM September 14, 2023

## 2023-09-14 NOTE — LETTER
9/14/2023       RE: Elver LESLIE Ruelas  [address] [place] [address]     To whom it may concern,    I examined this patient on 14 September 2023. He has no light perception (NLP) blindness in the right eye and mild visual impairment with best corrected acuity of 20/30 in the left eye.     Sincerely,    Rosalio Aguilar MD

## 2023-09-14 NOTE — PROGRESS NOTES
Chief Complaint   Patient presents with    COMPREHENSIVE EYE EXAM       HPI       COMPREHENSIVE EYE EXAM    In both eyes.  Since onset it is gradually worsening.             Comments    Elver Ruelas is a(n) 72 year old male who presents for a comprehensive exam. Last eye exam was 1 year(s) ago. Since exam, vision has worsened. Uses lubricating drops. No flashes and floaters. No eye pain.     Lab Results       Component                Value               Date                       A1C                      5.7                 06/22/2023              Joshua Parada COT 9:39 AM September 14, 2023               Last edited by Joshua Parada on 9/14/2023  9:39 AM.          Assessment & Plan   Elver Ruelas is a 72 year old male with the following diagnoses:   Encounter Diagnoses   Name Primary?    Pseudophakia - Left Eye Yes    Blind hypertensive eye, right     Chronic angle-closure glaucoma of left eye, mild stage     Mixed type age-related cataract, both eyes      Blind right eye is comfortable, IOP still 33 as it was 1 year ago  LPI transilluminates in the left eye well and IOP good at 10  Would benfit from glasses.  Again discussed monocular precautions  Mild decrease from 20/25 to 20/30 left eye due to dry eyes     Plan  - Discussed monocular precautions to wear glasses at all times of day to prevent/diminish probability of catastrophic injury to the left sighted eye  - Refraction given   - Increase regular artificial tears to 4x/day at least; switch to preservative free artificial tears up to q1hr   - Letter provided stating he is has mild visual impairment in the left sighted eye and NLP blindness in the right eye.     Patient disposition: Return in about 6 months (around 3/14/2024) for VT only, OCT NFL.          Seen with Dr. Marcos Aguilar.     My privilege to be part of your care,  Max Guaman MD, MSc  Ophthalmology PGY-4 resident physician    Attending Physician Attestation:  Complete documentation of historical and  exam elements from today's encounter can be found in the full encounter summary report (not reduplicated in this progress note).  I personally obtained the chief complaint(s) and history of present illness.  I confirmed and edited as necessary the review of systems, past medical/surgical history, family history, social history, and examination findings as documented by others; and I examined the patient myself.  I personally reviewed the relevant tests, images, and reports as documented above.  I formulated and edited as necessary the assessment and plan and discussed the findings and management plan with the patient and family. . - Rosalio Aguilar MD

## 2024-03-14 ENCOUNTER — OFFICE VISIT (OUTPATIENT)
Dept: OPHTHALMOLOGY | Facility: CLINIC | Age: 74
End: 2024-03-14
Attending: OPHTHALMOLOGY
Payer: MEDICARE

## 2024-03-14 DIAGNOSIS — H40.2221 CHRONIC ANGLE-CLOSURE GLAUCOMA OF LEFT EYE, MILD STAGE: Primary | ICD-10-CM

## 2024-03-14 DIAGNOSIS — H35.031 BLIND HYPERTENSIVE EYE, RIGHT: ICD-10-CM

## 2024-03-14 DIAGNOSIS — Z96.1 PSEUDOPHAKIA: ICD-10-CM

## 2024-03-14 DIAGNOSIS — H04.123 BILATERAL DRY EYES: ICD-10-CM

## 2024-03-14 PROCEDURE — G0463 HOSPITAL OUTPT CLINIC VISIT: HCPCS | Performed by: OPHTHALMOLOGY

## 2024-03-14 PROCEDURE — 99213 OFFICE O/P EST LOW 20 MIN: CPT | Mod: GC | Performed by: OPHTHALMOLOGY

## 2024-03-14 PROCEDURE — 92133 CPTRZD OPH DX IMG PST SGM ON: CPT | Performed by: OPHTHALMOLOGY

## 2024-03-14 ASSESSMENT — CUP TO DISC RATIO
OS_RATIO: 0.3
OD_RATIO: 0.9

## 2024-03-14 ASSESSMENT — VISUAL ACUITY
OS_SC+: -2
OD_SC: NLP
OS_SC: 20/40
METHOD: SNELLEN - LINEAR

## 2024-03-14 ASSESSMENT — TONOMETRY
OD_IOP_MMHG: 25
IOP_METHOD: TONOPEN
OS_IOP_MMHG: 8
OS_IOP_MMHG: 11
OD_IOP_MMHG: 40
IOP_METHOD: APPLANATION

## 2024-03-14 ASSESSMENT — EXTERNAL EXAM - RIGHT EYE: OD_EXAM: NORMAL

## 2024-03-14 ASSESSMENT — EXTERNAL EXAM - LEFT EYE: OS_EXAM: NORMAL

## 2024-03-14 NOTE — NURSING NOTE
Chief Complaints and History of Present Illnesses   Patient presents with    Glaucoma Follow-Up     Chief Complaint(s) and History of Present Illness(es)       Glaucoma Follow-Up              Laterality: left eye    Associated symptoms: Negative for eye pain, flashes and floaters              Comments    Here for glaucoma follow up. VA is worsening. Uses drops as instructed. No flashes or floaters. No eye pain.    Joshua Parada COT 9:00 AM March 14, 2024

## 2024-03-14 NOTE — PROGRESS NOTES
Chief Complaint(s) and History of Present Illness(es)     Glaucoma Follow-Up            Laterality: left eye    Associated symptoms: Negative for eye pain, flashes and floaters      Comments    Here for glaucoma follow up. VA is worsening. Uses drops as instructed. No   flashes or floaters. No eye pain.    Joshua Parada COT 9:00 AM March 14, 2024           Review of systems for the eyes was negative other than the pertinent positives/negatives listed in the HPI.      Assessment & Plan      Elver Ruelas is a 73 year old male with the following diagnoses:   1. Chronic angle-closure glaucoma of left eye, mild stage    2. Blind hypertensive eye, right    3. Pseudophakia - Left Eye    4. Bilateral dry eyes         Feels vision is worsening in his left eye  Using artificial tears 2-3 times a day  (previously advised 4 times per day)  Pt did not bring his glasses today, has some at home that he says were made a few months ago    Blind right eye remains comfortable other than some itchiness, IOP acceptable     Discussed monocular precautions   Refraction given again today   Increase preservative free artificial tears to Q2-3hours while awake    Patient disposition:   Return in about 4 weeks (around 4/11/2024) for VT only, Refraction.    Halle Patten MD  Ophthalmology Resident, PGY-4  [facility]         Attending Physician Attestation:  Complete documentation of historical and exam elements from today's encounter can be found in the full encounter summary report (not reduplicated in this progress note).  I personally obtained the chief complaint(s) and history of present illness.  I confirmed and edited as necessary the review of systems, past medical/surgical history, family history, social history, and examination findings as documented by others; and I examined the patient myself.  I personally reviewed the relevant tests, images, and reports as documented above.  I formulated and edited as necessary the  assessment and plan and discussed the findings and management plan with the patient and family. Attending Physician Image/Tesing Attestation: I personally reviewed the ophthalmic test(s) associated with this encounter, agree with the interpretation(s) as documented by the resident/fellow, and have edited the corresponding report(s) as necessary.   - Rosalio Aguilar MD

## 2024-05-23 ENCOUNTER — OFFICE VISIT (OUTPATIENT)
Dept: OPHTHALMOLOGY | Facility: CLINIC | Age: 74
End: 2024-05-23
Attending: OPHTHALMOLOGY
Payer: MEDICARE

## 2024-05-23 DIAGNOSIS — H04.123 BILATERAL DRY EYES: Primary | ICD-10-CM

## 2024-05-23 DIAGNOSIS — H40.2221 CHRONIC ANGLE-CLOSURE GLAUCOMA OF LEFT EYE, MILD STAGE: ICD-10-CM

## 2024-05-23 DIAGNOSIS — Z96.1 PSEUDOPHAKIA: ICD-10-CM

## 2024-05-23 DIAGNOSIS — H35.031 BLIND HYPERTENSIVE EYE, RIGHT: ICD-10-CM

## 2024-05-23 PROCEDURE — 99213 OFFICE O/P EST LOW 20 MIN: CPT | Performed by: OPHTHALMOLOGY

## 2024-05-23 PROCEDURE — G0463 HOSPITAL OUTPT CLINIC VISIT: HCPCS | Performed by: OPHTHALMOLOGY

## 2024-05-23 ASSESSMENT — REFRACTION_MANIFEST
OS_SPHERE: -0.75
OD_SPHERE: BALANCE
OS_ADD: +2.50
OS_CYLINDER: +0.50
OS_AXIS: 040

## 2024-05-23 ASSESSMENT — REFRACTION_WEARINGRX
OD_SPHERE: +4.00
OS_CYLINDER: SPHERE
OS_SPHERE: +2.25
OD_CYLINDER: SPHERE

## 2024-05-23 ASSESSMENT — TONOMETRY
OD_IOP_MMHG: 45
IOP_METHOD: TONOPEN
OS_IOP_MMHG: 10

## 2024-05-23 ASSESSMENT — VISUAL ACUITY
OD_SC: NLP
OS_SC: 20/40
OS_SC+: -2
METHOD: SNELLEN - LINEAR
METHOD_MR_RETINOSCOPY: 1

## 2024-05-23 ASSESSMENT — EXTERNAL EXAM - LEFT EYE: OS_EXAM: NORMAL

## 2024-05-23 ASSESSMENT — EXTERNAL EXAM - RIGHT EYE: OD_EXAM: NORMAL

## 2024-05-23 ASSESSMENT — CUP TO DISC RATIO
OD_RATIO: 0.9
OS_RATIO: 0.3

## 2024-05-23 NOTE — PROGRESS NOTES
HPI       Follow Up    In both eyes.  Charactertized as  blurred vision.  Associated symptoms include Negative for eye pain.             Comments    Here for follow up. VA is blurry. No eye pain. No flashes or floaters.    Joshua Parada COT 9:35 AM May 23, 2024             Last edited by Joshua Parada on 5/23/2024  9:35 AM.          Review of systems for the eyes was negative other than the pertinent positives/negatives listed in the HPI.      Assessment & Plan      Elver Ruelas is a 73 year old male with the following diagnoses:   1. Bilateral dry eyes    2. Pseudophakia - Left Eye    3. Chronic angle-closure glaucoma of left eye, mild stage    4. Blind hypertensive eye, right       History obtained via interpretor   Here for follow up of blur and dry eye   Wearing glasses for reading only  Using artificial tears three times a day in both eyes   Blind right eye remains comfortable other than some itchiness, IOP acceptable     Refractive options reviewed  Refraction given   Again reviewed monocular precautions   Warm compresses twice a day   Again encouraged increased preservative free artificial tears to Q2-3hours while awake       Patient disposition:   Return in about 6 months (around 11/23/2024) for DFE, OCT NFL.           Attending Physician Attestation:  Complete documentation of historical and exam elements from today's encounter can be found in the full encounter summary report (not reduplicated in this progress note).  I personally obtained the chief complaint(s) and history of present illness.  I confirmed and edited as necessary the review of systems, past medical/surgical history, family history, social history, and examination findings as documented by others; and I examined the patient myself.  I personally reviewed the relevant tests, images, and reports as documented above.  I formulated and edited as necessary the assessment and plan and discussed the findings and management plan with the patient and  family. . - Rosalio Aguilar MD

## 2024-05-23 NOTE — NURSING NOTE
Chief Complaints and History of Present Illnesses   Patient presents with    Follow Up     Chief Complaint(s) and History of Present Illness(es)       Follow Up              Laterality: both eyes    Quality: blurred vision    Associated symptoms: Negative for eye pain              Comments    Here for follow up. VA is blurry. No eye pain. No flashes or floaters.    Joshua REEDER 9:35 AM May 23, 2024

## 2024-07-11 ENCOUNTER — LAB REQUISITION (OUTPATIENT)
Dept: LAB | Facility: CLINIC | Age: 74
End: 2024-07-11
Payer: MEDICARE

## 2024-07-11 DIAGNOSIS — Z00.00 ENCOUNTER FOR GENERAL ADULT MEDICAL EXAMINATION WITHOUT ABNORMAL FINDINGS: ICD-10-CM

## 2024-07-11 LAB
ALBUMIN SERPL BCG-MCNC: 4.7 G/DL (ref 3.5–5.2)
ALP SERPL-CCNC: 64 U/L (ref 40–150)
ALT SERPL W P-5'-P-CCNC: 22 U/L (ref 0–70)
ANION GAP SERPL CALCULATED.3IONS-SCNC: 11 MMOL/L (ref 7–15)
AST SERPL W P-5'-P-CCNC: 28 U/L (ref 0–45)
BILIRUB SERPL-MCNC: 0.5 MG/DL
BUN SERPL-MCNC: 12.5 MG/DL (ref 8–23)
CALCIUM SERPL-MCNC: 9.5 MG/DL (ref 8.8–10.2)
CHLORIDE SERPL-SCNC: 99 MMOL/L (ref 98–107)
CHOLEST SERPL-MCNC: 181 MG/DL
CREAT SERPL-MCNC: 0.75 MG/DL (ref 0.67–1.17)
DEPRECATED HCO3 PLAS-SCNC: 25 MMOL/L (ref 22–29)
EGFRCR SERPLBLD CKD-EPI 2021: >90 ML/MIN/1.73M2
FASTING STATUS PATIENT QL REPORTED: YES
FASTING STATUS PATIENT QL REPORTED: YES
GLUCOSE SERPL-MCNC: 115 MG/DL (ref 70–99)
HDLC SERPL-MCNC: 67 MG/DL
LDLC SERPL CALC-MCNC: 99 MG/DL
NONHDLC SERPL-MCNC: 114 MG/DL
POTASSIUM SERPL-SCNC: 5 MMOL/L (ref 3.4–5.3)
PROT SERPL-MCNC: 7.5 G/DL (ref 6.4–8.3)
SODIUM SERPL-SCNC: 135 MMOL/L (ref 135–145)
TRIGL SERPL-MCNC: 77 MG/DL

## 2024-07-11 PROCEDURE — 80061 LIPID PANEL: CPT | Mod: ORL | Performed by: FAMILY MEDICINE

## 2024-07-11 PROCEDURE — 80053 COMPREHEN METABOLIC PANEL: CPT | Mod: ORL | Performed by: FAMILY MEDICINE

## 2024-08-14 ENCOUNTER — TELEPHONE (OUTPATIENT)
Dept: OPHTHALMOLOGY | Facility: CLINIC | Age: 74
End: 2024-08-14
Payer: MEDICARE

## 2024-09-14 ENCOUNTER — HEALTH MAINTENANCE LETTER (OUTPATIENT)
Age: 74
End: 2024-09-14

## 2024-11-14 ENCOUNTER — OFFICE VISIT (OUTPATIENT)
Dept: OPHTHALMOLOGY | Facility: CLINIC | Age: 74
End: 2024-11-14
Attending: OPHTHALMOLOGY
Payer: MEDICARE

## 2024-11-14 DIAGNOSIS — H40.2221 CHRONIC ANGLE-CLOSURE GLAUCOMA OF LEFT EYE, MILD STAGE: ICD-10-CM

## 2024-11-14 DIAGNOSIS — Z96.1 PSEUDOPHAKIA: Primary | ICD-10-CM

## 2024-11-14 DIAGNOSIS — H54.1151 BLINDNESS RIGHT EYE CATEGORY 5, LOW VISION LEFT EYE CATEGORY 1: ICD-10-CM

## 2024-11-14 DIAGNOSIS — H04.123 BILATERAL DRY EYES: ICD-10-CM

## 2024-11-14 PROCEDURE — 92133 CPTRZD OPH DX IMG PST SGM ON: CPT | Performed by: OPHTHALMOLOGY

## 2024-11-14 PROCEDURE — G0463 HOSPITAL OUTPT CLINIC VISIT: HCPCS | Performed by: OPHTHALMOLOGY

## 2024-11-14 PROCEDURE — T1013 SIGN LANG/ORAL INTERPRETER: HCPCS | Mod: U4

## 2024-11-14 PROCEDURE — T1013 SIGN LANG/ORAL INTERPRETER: HCPCS | Mod: GT

## 2024-11-14 RX ORDER — CARBOXYMETHYLCELLULOSE SODIUM 5 MG/ML
1 SOLUTION/ DROPS OPHTHALMIC
Qty: 120 EACH | Refills: 11 | Status: SHIPPED | OUTPATIENT
Start: 2024-11-14 | End: 2024-11-15

## 2024-11-14 ASSESSMENT — CONF VISUAL FIELD
METHOD: COUNTING FINGERS
OD_INFERIOR_TEMPORAL_RESTRICTION: 1
OS_SUPERIOR_NASAL_RESTRICTION: 0
OS_NORMAL: 1
OD_SUPERIOR_NASAL_RESTRICTION: 1
OD_INFERIOR_NASAL_RESTRICTION: 1
OS_SUPERIOR_TEMPORAL_RESTRICTION: 0
OS_INFERIOR_NASAL_RESTRICTION: 0
OD_SUPERIOR_TEMPORAL_RESTRICTION: 1
OS_INFERIOR_TEMPORAL_RESTRICTION: 0

## 2024-11-14 ASSESSMENT — TONOMETRY
OD_IOP_MMHG: 24
IOP_METHOD: TONOPEN
OS_IOP_MMHG: 10

## 2024-11-14 ASSESSMENT — VISUAL ACUITY
METHOD: SNELLEN - LINEAR
OD_SC: NLP
OS_SC: 20/50
OS_SC+: -2

## 2024-11-14 ASSESSMENT — CUP TO DISC RATIO
OD_RATIO: 0.9
OS_RATIO: 0.3

## 2024-11-14 ASSESSMENT — EXTERNAL EXAM - LEFT EYE: OS_EXAM: NORMAL

## 2024-11-14 ASSESSMENT — EXTERNAL EXAM - RIGHT EYE: OD_EXAM: NORMAL

## 2024-11-14 NOTE — PATIENT INSTRUCTIONS
You left eye is blurry due to dry eye and a need for prescription glasses    Start artificial tears 4-6x daily in the left eye (prescription sent to [facility])    Wear your prescription glasses full time

## 2024-11-14 NOTE — PROGRESS NOTES
HPI       Follow Up    In both eyes.  Charactertized as  blurred vision.  Since onset it is gradually worsening.  Associated symptoms include Negative for eye pain, flashes and floaters.  Treatments tried include artificial tears.             Comments    Here for follow up. VA is worsening. Uses drops as needed. No eye pain. No flashes or floaters.    Joshua REEDER 8:12 AM November 14, 2024     History and physical examination completed with assistance of telephone                Last edited by Joshua Parada on 11/14/2024  8:12 AM.          Review of systems for the eyes was negative other than the pertinent positives/negatives listed in the HPI.      Assessment & Plan      Elver Ruelas is a 74 year old male with the following diagnoses:   1. Pseudophakia - Left Eye    2. Bilateral dry eyes    3. Chronic angle-closure glaucoma of left eye, mild stage    4. Blindness right eye category 5, low vision left eye category 1         History obtained via interpretor   Complains of worsening blur in the left eye   Not using drops, not wearing glasses (except when reading)       Dilated fundus exam stable  Intraocular pressure acceptable  Again reviewed monocular precautions and recommendation for full time prescription wear  Warm compresses twice a day   Again encouraged increased preservative free artificial tears to Q2-3hours while awake - prescription sent    Patient disposition:   Return in about 6 months (around 5/14/2025) for VT only, Refraction, OCT Macula.           Attending Physician Attestation:  Complete documentation of historical and exam elements from today's encounter can be found in the full encounter summary report (not reduplicated in this progress note).  I personally obtained the chief complaint(s) and history of present illness.  I confirmed and edited as necessary the review of systems, past medical/surgical history, family history, social history, and examination findings as documented by  others; and I examined the patient myself.  I personally reviewed the relevant tests, images, and reports as documented above.  I formulated and edited as necessary the assessment and plan and discussed the findings and management plan with the patient and family. . - Rosalio Aguilar MD

## 2025-05-02 ENCOUNTER — LAB REQUISITION (OUTPATIENT)
Dept: LAB | Facility: CLINIC | Age: 75
End: 2025-05-02
Payer: MEDICARE

## 2025-05-02 ENCOUNTER — MEDICAL CORRESPONDENCE (OUTPATIENT)
Dept: HEALTH INFORMATION MANAGEMENT | Facility: CLINIC | Age: 75
End: 2025-05-02

## 2025-05-02 DIAGNOSIS — L02.91 CUTANEOUS ABSCESS, UNSPECIFIED: ICD-10-CM

## 2025-05-02 PROCEDURE — 87205 SMEAR GRAM STAIN: CPT | Mod: ORL | Performed by: NURSE PRACTITIONER

## 2025-05-04 LAB
BACTERIA ABSC ANAEROBE+AEROBE CULT: ABNORMAL
GRAM STAIN RESULT: ABNORMAL
GRAM STAIN RESULT: ABNORMAL

## 2025-05-05 ENCOUNTER — TRANSCRIBE ORDERS (OUTPATIENT)
Dept: OTHER | Age: 75
End: 2025-05-05

## 2025-05-05 ENCOUNTER — MEDICAL CORRESPONDENCE (OUTPATIENT)
Dept: HEALTH INFORMATION MANAGEMENT | Facility: CLINIC | Age: 75
End: 2025-05-05

## 2025-05-05 DIAGNOSIS — L02.91 ABSCESS: Primary | ICD-10-CM

## 2025-05-06 ENCOUNTER — PATIENT OUTREACH (OUTPATIENT)
Dept: CARE COORDINATION | Facility: CLINIC | Age: 75
End: 2025-05-06
Payer: MEDICARE

## 2025-05-06 NOTE — TELEPHONE ENCOUNTER
REFERRAL INFORMATION:  Referring Provider:  Gregory Daniels APRN CNP   Referring Clinic:  [facility] clinic at [address] [place] [id number].   Reason for Visit/Diagnosis: L02.91 (ICD-10-CM) - Abscess        FUTURE VISIT INFORMATION:  Appointment Date: 5/9/25  Appointment Time:      NOTES RECORD STATUS  DETAILS   OFFICE NOTE from Referring Provider Care Everywhere 5/2/25   OFFICE NOTE from Other Specialists Care Everywhere 5/5/25-Rosalio NAVARRO CNP-[facility]   PERTINENT LABS Internal      Records Requested    Facility    Fax:    Outcome

## 2025-05-09 ENCOUNTER — PRE VISIT (OUTPATIENT)
Dept: SURGERY | Facility: CLINIC | Age: 75
End: 2025-05-09

## 2025-05-12 ENCOUNTER — OFFICE VISIT (OUTPATIENT)
Dept: SURGERY | Facility: CLINIC | Age: 75
End: 2025-05-12
Payer: MEDICARE

## 2025-05-12 VITALS
DIASTOLIC BLOOD PRESSURE: 78 MMHG | SYSTOLIC BLOOD PRESSURE: 143 MMHG | HEART RATE: 82 BPM | OXYGEN SATURATION: 97 % | WEIGHT: 144.3 LBS | BODY MASS INDEX: 26.55 KG/M2 | HEIGHT: 62 IN

## 2025-05-12 DIAGNOSIS — L02.31 ABSCESS OF RIGHT BUTTOCK: Primary | ICD-10-CM

## 2025-05-12 PROCEDURE — 99213 OFFICE O/P EST LOW 20 MIN: CPT | Performed by: SURGERY

## 2025-05-12 PROCEDURE — 3077F SYST BP >= 140 MM HG: CPT | Performed by: SURGERY

## 2025-05-12 PROCEDURE — 3078F DIAST BP <80 MM HG: CPT | Performed by: SURGERY

## 2025-05-12 NOTE — PROGRESS NOTES
Surgery note    S/p lancing of buttock wound. Has been packing. No complaints or issues.    Today wound looks healthy and is very shallow.    No indication for further antibiotics, no need to continue packing, no further scheduled follow up is needed.     Return to clinic PRN.  Fran Bradford MD   Critical Care and Acute Care Surgery

## 2025-05-12 NOTE — NURSING NOTE
"Chief Complaint   Patient presents with    RECHECK     Buttock wound check       Vitals:    05/12/25 1155   BP: (!) 143/78   BP Location: Left arm   Patient Position: Sitting   Cuff Size: Adult Regular   Pulse: 82   SpO2: 97%   Weight: 65.5 kg (144 lb 4.8 oz)   Height: 1.575 m (5' 2\")       Body mass index is 26.39 kg/m .                          Juan Michael, EMT    "

## 2025-05-12 NOTE — LETTER
5/12/2025       RE: Elver Ruelas  [address] [place] [address]     Dear Colleague,    Thank you for referring your patient, Elver Ruelas, to the [facility] [place] [facility]. Please see a copy of my visit note below.    Surgery note    S/p lancing of buttock wound. Has been packing. No complaints or issues.    Today wound looks healthy and is very shallow.    No indication for further antibiotics, no need to continue packing, no further scheduled follow up is needed.     Return to clinic PRN.  Fran Bradford MD   Critical Care and Acute Care Surgery      Again, thank you for allowing me to participate in the care of your patient.      Sincerely,    Fran Bradford MD

## 2025-05-22 ENCOUNTER — OFFICE VISIT (OUTPATIENT)
Dept: OPHTHALMOLOGY | Facility: CLINIC | Age: 75
End: 2025-05-22
Attending: OPHTHALMOLOGY
Payer: MEDICARE

## 2025-05-22 DIAGNOSIS — H04.123 BILATERAL DRY EYES: ICD-10-CM

## 2025-05-22 DIAGNOSIS — Z96.1 PSEUDOPHAKIA: Primary | ICD-10-CM

## 2025-05-22 DIAGNOSIS — H40.2221 CHRONIC ANGLE-CLOSURE GLAUCOMA OF LEFT EYE, MILD STAGE: ICD-10-CM

## 2025-05-22 DIAGNOSIS — H54.1151 BLINDNESS RIGHT EYE CATEGORY 5, LOW VISION LEFT EYE CATEGORY 1: ICD-10-CM

## 2025-05-22 PROCEDURE — 92015 DETERMINE REFRACTIVE STATE: CPT | Mod: GY

## 2025-05-22 PROCEDURE — G0463 HOSPITAL OUTPT CLINIC VISIT: HCPCS | Performed by: OPHTHALMOLOGY

## 2025-05-22 PROCEDURE — 92134 CPTRZ OPH DX IMG PST SGM RTA: CPT | Performed by: OPHTHALMOLOGY

## 2025-05-22 ASSESSMENT — VISUAL ACUITY
OS_SC: 20/50
OD_SC: NLP
METHOD: SNELLEN - LINEAR
METHOD_MR_RETINOSCOPY: 1

## 2025-05-22 ASSESSMENT — REFRACTION_MANIFEST
OD_SPHERE: BALANCE
OS_AXIS: 045
OS_SPHERE: -0.75
OS_CYLINDER: +0.75
OS_ADD: +2.50
OD_ADD: +2.50

## 2025-05-22 ASSESSMENT — CUP TO DISC RATIO
OS_RATIO: 0.3
OD_RATIO: 0.9

## 2025-05-22 ASSESSMENT — TONOMETRY
IOP_METHOD: TONOPEN
OS_IOP_MMHG: 12
OD_IOP_MMHG: 31

## 2025-05-22 ASSESSMENT — EXTERNAL EXAM - LEFT EYE: OS_EXAM: NORMAL

## 2025-05-22 ASSESSMENT — EXTERNAL EXAM - RIGHT EYE: OD_EXAM: NORMAL

## 2025-05-22 NOTE — PROGRESS NOTES
HPI       Follow Up    In both eyes.  Since onset it is gradually worsening.  Associated symptoms include Negative for eye pain, flashes and floaters.  Treatments tried include artificial tears.             Comments    Here for follow up. VA is worsening. No eye pain. No flashes or floaters. Uses drops as needed.    LARRY Acosta 10:36 AM May 22, 2025     History and physical examination completed with assistance of telephone              Last edited by Joshua Parada COMT on 5/22/2025 10:36 AM.          Review of systems for the eyes was negative other than the pertinent positives/negatives listed in the HPI.      Assessment & Plan      Elver Ruelas is a 74 year old male with the following diagnoses:   1. Pseudophakia - Left Eye    2. Blindness right eye category 5, low vision left eye category 1    3. Chronic angle-closure glaucoma of left eye, mild stage    4. Bilateral dry eyes           History obtained via interpretor   Complains of worsening blur in the left eye   Using artificial tears only occasionally   Again not wearing glasses (except when reading)     Exam stable c acceptable intraocular pressure  Again reviewed monocular precautions and recommendation for full time prescription wear  Again encouraged increased preservative free artificial tears to Q2-3hours while awake - prescription in system  Warm compresses twice a day     Patient disposition:   Return in about 6 months (around 11/22/2025) for DFE, OCT NFL.           Attending Physician Attestation:  Complete documentation of historical and exam elements from today's encounter can be found in the full encounter summary report (not reduplicated in this progress note).  I personally obtained the chief complaint(s) and history of present illness.  I confirmed and edited as necessary the review of systems, past medical/surgical history, family history, social history, and examination findings as documented by others; and I examined the patient  myself.  I personally reviewed the relevant tests, images, and reports as documented above.  I formulated and edited as necessary the assessment and plan and discussed the findings and management plan with the patient and family. . - Rosalio Aguilar MD

## 2025-07-18 ENCOUNTER — LAB REQUISITION (OUTPATIENT)
Dept: LAB | Facility: CLINIC | Age: 75
End: 2025-07-18
Payer: MEDICARE

## 2025-07-18 DIAGNOSIS — Z00.00 ENCOUNTER FOR GENERAL ADULT MEDICAL EXAMINATION WITHOUT ABNORMAL FINDINGS: ICD-10-CM

## 2025-07-18 DIAGNOSIS — I10 ESSENTIAL (PRIMARY) HYPERTENSION: ICD-10-CM

## 2025-07-18 LAB
ALBUMIN SERPL BCG-MCNC: 4.3 G/DL (ref 3.5–5.2)
ALP SERPL-CCNC: 57 U/L (ref 40–150)
ALT SERPL W P-5'-P-CCNC: 27 U/L (ref 0–70)
ANION GAP SERPL CALCULATED.3IONS-SCNC: 10 MMOL/L (ref 7–15)
AST SERPL W P-5'-P-CCNC: 30 U/L (ref 0–45)
BILIRUB SERPL-MCNC: 0.4 MG/DL
BUN SERPL-MCNC: 20.2 MG/DL (ref 8–23)
CALCIUM SERPL-MCNC: 9.4 MG/DL (ref 8.8–10.4)
CHLORIDE SERPL-SCNC: 102 MMOL/L (ref 98–107)
CHOLEST SERPL-MCNC: 161 MG/DL
CREAT SERPL-MCNC: 0.85 MG/DL (ref 0.67–1.17)
EGFRCR SERPLBLD CKD-EPI 2021: >90 ML/MIN/1.73M2
FASTING STATUS PATIENT QL REPORTED: YES
FASTING STATUS PATIENT QL REPORTED: YES
GLUCOSE SERPL-MCNC: 114 MG/DL (ref 70–99)
HCO3 SERPL-SCNC: 26 MMOL/L (ref 22–29)
HDLC SERPL-MCNC: 58 MG/DL
LDLC SERPL CALC-MCNC: 90 MG/DL
NONHDLC SERPL-MCNC: 103 MG/DL
POTASSIUM SERPL-SCNC: 4.4 MMOL/L (ref 3.4–5.3)
PROT SERPL-MCNC: 6.8 G/DL (ref 6.4–8.3)
SODIUM SERPL-SCNC: 138 MMOL/L (ref 135–145)
TRIGL SERPL-MCNC: 67 MG/DL

## 2025-07-18 PROCEDURE — 80061 LIPID PANEL: CPT | Mod: ORL | Performed by: FAMILY MEDICINE

## 2025-07-18 PROCEDURE — 80053 COMPREHEN METABOLIC PANEL: CPT | Mod: ORL | Performed by: FAMILY MEDICINE

## 2025-08-04 ENCOUNTER — TRANSCRIBE ORDERS (OUTPATIENT)
Dept: OTHER | Age: 75
End: 2025-08-04

## 2025-08-04 DIAGNOSIS — R06.02 SOB (SHORTNESS OF BREATH): ICD-10-CM

## 2025-08-04 DIAGNOSIS — R05.3 CHRONIC COUGH: Primary | ICD-10-CM

## 2025-08-04 DIAGNOSIS — J34.2 DNS (DEVIATED NASAL SEPTUM): ICD-10-CM

## 2025-08-05 ENCOUNTER — PATIENT OUTREACH (OUTPATIENT)
Dept: CARE COORDINATION | Facility: CLINIC | Age: 75
End: 2025-08-05
Payer: MEDICARE

## 2025-08-07 ENCOUNTER — PATIENT OUTREACH (OUTPATIENT)
Dept: CARE COORDINATION | Facility: CLINIC | Age: 75
End: 2025-08-07
Payer: MEDICARE

## (undated) DEVICE — GLOVE PROTEXIS MICRO 7.5  2D73PM75

## (undated) DEVICE — EYE KNIFE STILETTO VISITEC 1.1MM ANG 45DEG SIDEPORT 376620

## (undated) DEVICE — EYE CANN IRR 25GA CYSTOTOME 581610

## (undated) DEVICE — LINEN TOWEL PACK X5 5464

## (undated) DEVICE — EYE TIP IRRIGATION & ASPIRATION POLYMER CVD 0.3MM 8065751512

## (undated) DEVICE — EYE KNIFE SLIT XSTAR VISITEC 2.6MM 45DEG 373726

## (undated) DEVICE — EYE SHIELD PLASTIC

## (undated) DEVICE — PACK CATARACT CUSTOM ASC SEY15CPUMC

## (undated) DEVICE — SOL WATER IRRIG 500ML BOTTLE 2F7113

## (undated) DEVICE — EYE PACK CUSTOM ANTERIOR 30DEG TIP CENTURION PPK6682-04

## (undated) DEVICE — EYE CANN IRR 27GA ANTERIOR CHAMBER 581280

## (undated) RX ORDER — FENTANYL CITRATE-0.9 % NACL/PF 10 MCG/ML
PLASTIC BAG, INJECTION (ML) INTRAVENOUS
Status: DISPENSED
Start: 2022-08-15

## (undated) RX ORDER — EPHEDRINE SULFATE 50 MG/ML
INJECTION, SOLUTION INTRAMUSCULAR; INTRAVENOUS; SUBCUTANEOUS
Status: DISPENSED
Start: 2022-08-15

## (undated) RX ORDER — FENTANYL CITRATE 50 UG/ML
INJECTION, SOLUTION INTRAMUSCULAR; INTRAVENOUS
Status: DISPENSED
Start: 2022-08-15

## (undated) RX ORDER — LIDOCAINE HYDROCHLORIDE 20 MG/ML
INJECTION, SOLUTION EPIDURAL; INFILTRATION; INTRACAUDAL; PERINEURAL
Status: DISPENSED
Start: 2022-08-15

## (undated) RX ORDER — DEXAMETHASONE SODIUM PHOSPHATE 4 MG/ML
INJECTION, SOLUTION INTRA-ARTICULAR; INTRALESIONAL; INTRAMUSCULAR; INTRAVENOUS; SOFT TISSUE
Status: DISPENSED
Start: 2022-08-15

## (undated) RX ORDER — ONDANSETRON 2 MG/ML
INJECTION INTRAMUSCULAR; INTRAVENOUS
Status: DISPENSED
Start: 2022-08-15

## (undated) RX ORDER — ACETAMINOPHEN 325 MG/1
TABLET ORAL
Status: DISPENSED
Start: 2022-08-15

## (undated) RX ORDER — PROPOFOL 10 MG/ML
INJECTION, EMULSION INTRAVENOUS
Status: DISPENSED
Start: 2022-08-15